# Patient Record
Sex: FEMALE | Race: WHITE | NOT HISPANIC OR LATINO
[De-identification: names, ages, dates, MRNs, and addresses within clinical notes are randomized per-mention and may not be internally consistent; named-entity substitution may affect disease eponyms.]

---

## 2018-01-08 ENCOUNTER — APPOINTMENT (OUTPATIENT)
Dept: GYNECOLOGIC ONCOLOGY | Facility: CLINIC | Age: 58
End: 2018-01-08
Payer: COMMERCIAL

## 2018-01-10 ENCOUNTER — APPOINTMENT (OUTPATIENT)
Dept: GYNECOLOGIC ONCOLOGY | Facility: CLINIC | Age: 58
End: 2018-01-10
Payer: COMMERCIAL

## 2018-01-10 VITALS
HEART RATE: 74 BPM | SYSTOLIC BLOOD PRESSURE: 146 MMHG | HEIGHT: 61 IN | BODY MASS INDEX: 30.02 KG/M2 | WEIGHT: 159 LBS | DIASTOLIC BLOOD PRESSURE: 84 MMHG

## 2018-01-10 PROCEDURE — 57452 EXAM OF CERVIX W/SCOPE: CPT

## 2018-01-11 LAB — HPV HIGH+LOW RISK DNA PNL CVX: NOT DETECTED

## 2018-01-23 LAB — CYTOLOGY CVX/VAG DOC THIN PREP: NORMAL

## 2018-06-19 ENCOUNTER — OTHER (OUTPATIENT)
Age: 58
End: 2018-06-19

## 2018-06-27 ENCOUNTER — APPOINTMENT (OUTPATIENT)
Dept: OBGYN | Facility: CLINIC | Age: 58
End: 2018-06-27
Payer: COMMERCIAL

## 2018-06-27 ENCOUNTER — ASOB RESULT (OUTPATIENT)
Age: 58
End: 2018-06-27

## 2018-06-27 PROCEDURE — 76830 TRANSVAGINAL US NON-OB: CPT

## 2018-08-20 ENCOUNTER — APPOINTMENT (OUTPATIENT)
Dept: GYNECOLOGIC ONCOLOGY | Facility: CLINIC | Age: 58
End: 2018-08-20
Payer: COMMERCIAL

## 2018-08-20 VITALS
BODY MASS INDEX: 30.21 KG/M2 | HEIGHT: 61 IN | SYSTOLIC BLOOD PRESSURE: 140 MMHG | DIASTOLIC BLOOD PRESSURE: 80 MMHG | WEIGHT: 160 LBS

## 2018-08-20 DIAGNOSIS — B97.7 PAPILLOMAVIRUS AS THE CAUSE OF DISEASES CLASSIFIED ELSEWHERE: ICD-10-CM

## 2018-08-20 PROCEDURE — 57452 EXAM OF CERVIX W/SCOPE: CPT

## 2018-08-20 PROCEDURE — 99213 OFFICE O/P EST LOW 20 MIN: CPT | Mod: 25

## 2018-08-23 LAB — HPV HIGH+LOW RISK DNA PNL CVX: NOT DETECTED

## 2018-08-29 LAB — CYTOLOGY CVX/VAG DOC THIN PREP: NORMAL

## 2018-09-26 ENCOUNTER — APPOINTMENT (OUTPATIENT)
Dept: OBGYN | Facility: CLINIC | Age: 58
End: 2018-09-26
Payer: COMMERCIAL

## 2018-09-26 ENCOUNTER — ASOB RESULT (OUTPATIENT)
Age: 58
End: 2018-09-26

## 2018-09-26 PROCEDURE — 76830 TRANSVAGINAL US NON-OB: CPT

## 2019-08-22 ENCOUNTER — APPOINTMENT (OUTPATIENT)
Dept: GYNECOLOGIC ONCOLOGY | Facility: CLINIC | Age: 59
End: 2019-08-22
Payer: COMMERCIAL

## 2019-08-22 VITALS
DIASTOLIC BLOOD PRESSURE: 76 MMHG | HEIGHT: 61 IN | WEIGHT: 148 LBS | SYSTOLIC BLOOD PRESSURE: 138 MMHG | HEART RATE: 74 BPM | BODY MASS INDEX: 27.94 KG/M2

## 2019-08-22 DIAGNOSIS — N87.0 MILD CERVICAL DYSPLASIA: ICD-10-CM

## 2019-08-22 PROCEDURE — 57452 EXAM OF CERVIX W/SCOPE: CPT

## 2019-08-22 PROCEDURE — 99213 OFFICE O/P EST LOW 20 MIN: CPT | Mod: 25

## 2019-09-05 LAB
CYTOLOGY CVX/VAG DOC THIN PREP: ABNORMAL
HPV HIGH+LOW RISK DNA PNL CVX: NOT DETECTED

## 2019-09-27 ENCOUNTER — APPOINTMENT (OUTPATIENT)
Dept: NEUROSURGERY | Facility: CLINIC | Age: 59
End: 2019-09-27
Payer: COMMERCIAL

## 2019-09-27 DIAGNOSIS — I67.1 CEREBRAL ANEURYSM, NONRUPTURED: ICD-10-CM

## 2019-09-27 PROCEDURE — 99205 OFFICE O/P NEW HI 60 MIN: CPT

## 2019-10-03 NOTE — ASSESSMENT
[FreeTextEntry1] : \par \par \par \par 2019\par \par \par \par Re:	Rosana Durbin \par :   1960\par \par \par The patient is a 59-year-old who had light cheek tingling and right ear pain.  The ear pain persisted, and her PCP ordered an MRI/MRA, and she was told to go to the ER at [facility].  Further CT and CTA were performed.  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  She is here for evaluation.\par \par Her past medical history is positive for hypertension and fibromyalgia.  She has back and neck issues, discs, scoliosis, and various disc diseases up and down her spine.  Past surgical history is positive for , 18 breast biopsies for fibroadenoma with atypical cells but no cancer.  A cervical biopsy was negative.  She has no allergies.  Social history is negative.  She works as a .  Family history is negative.  She has a normal neurological examination.\par \par IMPRESSION: Given the fact that the CTA shows a 4 mm anterior communicating artery aneurysm, the whole situation is somewhat unclear since the original images show a 2 mm lesion.  To work this out, I suggest cerebral angiography.  When I told the patient this, she told me she was scheduled to have this with Dr. Yusuf at [place].  I agree with this.  Any further decision making regarding the treatment of the aneurysm or not will be based on my review of the formal angiogram.  I welcomed the family to come back and show me the images.\par \par \par \par Salty Ibrahim M.D., F.A.C.S.\par [facility]\par

## 2019-10-03 NOTE — PHYSICAL EXAM
[General Appearance - Alert] : alert [General Appearance - In No Acute Distress] : in no acute distress [Impaired Insight] : insight and judgment were intact [Oriented To Time, Place, And Person] : oriented to person, place, and time [Affect] : the affect was normal [Person] : oriented to person [Place] : oriented to place [Short Term Intact] : short term memory intact [Time] : oriented to time [Span Intact] : the attention span was normal [Remote Intact] : remote memory intact [Fluency] : fluency intact [Concentration Intact] : normal concentrating ability [Comprehension] : comprehension intact [Current Events] : adequate knowledge of current events [Past History] : adequate knowledge of personal past history [Vocabulary] : adequate range of vocabulary [Cranial Nerves Optic (II)] : visual acuity intact bilaterally,  pupils equal round and reactive to light [Cranial Nerves Oculomotor (III)] : extraocular motion intact [Cranial Nerves Trigeminal (V)] : facial sensation intact symmetrically [Cranial Nerves Vestibulocochlear (VIII)] : hearing was intact bilaterally [Cranial Nerves Facial (VII)] : face symmetrical [Cranial Nerves Glossopharyngeal (IX)] : tongue and palate midline [Cranial Nerves Accessory (XI - Cranial And Spinal)] : head turning and shoulder shrug symmetric [Cranial Nerves Hypoglossal (XII)] : there was no tongue deviation with protrusion [Motor Strength] : muscle strength was normal in all four extremities [Motor Tone] : muscle tone was normal in all four extremities [No Muscle Atrophy] : normal bulk in all four extremities [Motor Handedness Right-Handed] : the patient is right hand dominant [Sensation Tactile Decrease] : light touch was intact [Balance] : balance was intact [Outer Ear] : the ears and nose were normal in appearance [Extraocular Movements] : extraocular movements were intact [Neck Appearance] : the appearance of the neck was normal [] : no respiratory distress [Respiration, Rhythm And Depth] : normal respiratory rhythm and effort [Heart Rate And Rhythm] : heart rate was normal and rhythm regular [Exaggerated Use Of Accessory Muscles For Inspiration] : no accessory muscle use [Involuntary Movements] : no involuntary movements were seen [Abnormal Walk] : normal gait [Skin Color & Pigmentation] : normal skin color and pigmentation [Skin Turgor] : normal skin turgor [Past-pointing] : there was no past-pointing [Tremor] : no tremor present

## 2019-10-03 NOTE — HISTORY OF PRESENT ILLNESS
[FreeTextEntry1] : Incidental cerebral aneurysm [de-identified] : 60 yo right handed female presents to the office for a neurosurgical consultation for a small 2 mm aneurysm. In the beginning of September, she experienced tingling on the right cheek and pain in outer ear. She saw her PCP Sahra Barker who ordered MRI/A and a small 2 mm acomm artery aneurysm was seen. PCP called pt on on a Friday night and instructed her to go to ER. She went to [facility] and workup CT was negative for heme and CTA revealed a 4mm aneurysm. It is felt that pt should have an angiogram.Pt has it scheduled for Oct 18th, at [facility]. \par \par Plan: fu if needed\par

## 2020-01-24 ENCOUNTER — TRANSCRIPTION ENCOUNTER (OUTPATIENT)
Age: 60
End: 2020-01-24

## 2020-06-13 DIAGNOSIS — D70.8 OTHER NEUTROPENIA: ICD-10-CM

## 2020-06-13 DIAGNOSIS — Z92.89 PERSONAL HISTORY OF OTHER MEDICAL TREATMENT: ICD-10-CM

## 2020-08-20 ENCOUNTER — APPOINTMENT (OUTPATIENT)
Dept: GYNECOLOGIC ONCOLOGY | Facility: CLINIC | Age: 60
End: 2020-08-20
Payer: COMMERCIAL

## 2020-08-20 VITALS
SYSTOLIC BLOOD PRESSURE: 153 MMHG | TEMPERATURE: 98.3 F | WEIGHT: 166 LBS | OXYGEN SATURATION: 99 % | DIASTOLIC BLOOD PRESSURE: 82 MMHG | BODY MASS INDEX: 31.37 KG/M2 | HEART RATE: 105 BPM

## 2020-08-20 PROCEDURE — 99213 OFFICE O/P EST LOW 20 MIN: CPT

## 2020-09-02 LAB
CYTOLOGY CVX/VAG DOC THIN PREP: ABNORMAL
HPV HIGH+LOW RISK DNA PNL CVX: DETECTED

## 2020-10-02 ENCOUNTER — ASOB RESULT (OUTPATIENT)
Age: 60
End: 2020-10-02

## 2020-10-02 ENCOUNTER — APPOINTMENT (OUTPATIENT)
Dept: OBGYN | Facility: CLINIC | Age: 60
End: 2020-10-02
Payer: COMMERCIAL

## 2020-10-02 PROCEDURE — 76830 TRANSVAGINAL US NON-OB: CPT

## 2021-08-23 ENCOUNTER — APPOINTMENT (OUTPATIENT)
Dept: GYNECOLOGIC ONCOLOGY | Facility: CLINIC | Age: 61
End: 2021-08-23
Payer: COMMERCIAL

## 2021-08-23 VITALS
OXYGEN SATURATION: 98 % | BODY MASS INDEX: 31.91 KG/M2 | WEIGHT: 169 LBS | SYSTOLIC BLOOD PRESSURE: 151 MMHG | HEART RATE: 75 BPM | HEIGHT: 61 IN | DIASTOLIC BLOOD PRESSURE: 82 MMHG

## 2021-08-23 DIAGNOSIS — Z78.0 ASYMPTOMATIC MENOPAUSAL STATE: ICD-10-CM

## 2021-08-23 PROCEDURE — 99213 OFFICE O/P EST LOW 20 MIN: CPT

## 2021-08-26 LAB
HPV 16 E6+E7 MRNA CVX QL NAA+PROBE: NOT DETECTED
HPV18+45 E6+E7 MRNA CVX QL NAA+PROBE: NOT DETECTED

## 2021-09-13 ENCOUNTER — NON-APPOINTMENT (OUTPATIENT)
Age: 61
End: 2021-09-13

## 2021-10-01 ENCOUNTER — TRANSCRIPTION ENCOUNTER (OUTPATIENT)
Age: 61
End: 2021-10-01

## 2021-10-01 LAB — CYTOLOGY CVX/VAG DOC THIN PREP: ABNORMAL

## 2021-11-18 ENCOUNTER — OUTPATIENT (OUTPATIENT)
Dept: OUTPATIENT SERVICES | Facility: HOSPITAL | Age: 61
LOS: 1 days | End: 2021-11-18
Payer: COMMERCIAL

## 2021-11-18 VITALS
HEIGHT: 61 IN | HEART RATE: 81 BPM | SYSTOLIC BLOOD PRESSURE: 130 MMHG | RESPIRATION RATE: 16 BRPM | WEIGHT: 166.01 LBS | DIASTOLIC BLOOD PRESSURE: 74 MMHG | OXYGEN SATURATION: 98 % | TEMPERATURE: 98 F

## 2021-11-18 DIAGNOSIS — R87.612 LOW GRADE SQUAMOUS INTRAEPITHELIAL LESION ON CYTOLOGIC SMEAR OF CERVIX (LGSIL): ICD-10-CM

## 2021-11-18 DIAGNOSIS — Z98.890 OTHER SPECIFIED POSTPROCEDURAL STATES: Chronic | ICD-10-CM

## 2021-11-18 DIAGNOSIS — Z98.891 HISTORY OF UTERINE SCAR FROM PREVIOUS SURGERY: Chronic | ICD-10-CM

## 2021-11-18 DIAGNOSIS — Z98.89 OTHER SPECIFIED POSTPROCEDURAL STATES: Chronic | ICD-10-CM

## 2021-11-18 DIAGNOSIS — Z87.442 PERSONAL HISTORY OF URINARY CALCULI: Chronic | ICD-10-CM

## 2021-11-18 DIAGNOSIS — Z98.890 OTHER SPECIFIED POSTPROCEDURAL STATES: ICD-10-CM

## 2021-11-18 LAB
A1C WITH ESTIMATED AVERAGE GLUCOSE RESULT: 5.3 % — SIGNIFICANT CHANGE UP (ref 4–5.6)
ALBUMIN SERPL ELPH-MCNC: 4.5 G/DL — SIGNIFICANT CHANGE UP (ref 3.3–5)
ALP SERPL-CCNC: 61 U/L — SIGNIFICANT CHANGE UP (ref 40–120)
ALT FLD-CCNC: 35 U/L — HIGH (ref 4–33)
ANION GAP SERPL CALC-SCNC: 12 MMOL/L — SIGNIFICANT CHANGE UP (ref 7–14)
AST SERPL-CCNC: 23 U/L — SIGNIFICANT CHANGE UP (ref 4–32)
BILIRUB SERPL-MCNC: 0.4 MG/DL — SIGNIFICANT CHANGE UP (ref 0.2–1.2)
BLD GP AB SCN SERPL QL: NEGATIVE — SIGNIFICANT CHANGE UP
BUN SERPL-MCNC: 22 MG/DL — SIGNIFICANT CHANGE UP (ref 7–23)
CALCIUM SERPL-MCNC: 9.5 MG/DL — SIGNIFICANT CHANGE UP (ref 8.4–10.5)
CHLORIDE SERPL-SCNC: 104 MMOL/L — SIGNIFICANT CHANGE UP (ref 98–107)
CO2 SERPL-SCNC: 25 MMOL/L — SIGNIFICANT CHANGE UP (ref 22–31)
CREAT SERPL-MCNC: 0.85 MG/DL — SIGNIFICANT CHANGE UP (ref 0.5–1.3)
ESTIMATED AVERAGE GLUCOSE: 105 — SIGNIFICANT CHANGE UP
GLUCOSE SERPL-MCNC: 85 MG/DL — SIGNIFICANT CHANGE UP (ref 70–99)
HCT VFR BLD CALC: 45.5 % — HIGH (ref 34.5–45)
HGB BLD-MCNC: 14.7 G/DL — SIGNIFICANT CHANGE UP (ref 11.5–15.5)
MCHC RBC-ENTMCNC: 30.6 PG — SIGNIFICANT CHANGE UP (ref 27–34)
MCHC RBC-ENTMCNC: 32.3 GM/DL — SIGNIFICANT CHANGE UP (ref 32–36)
MCV RBC AUTO: 94.8 FL — SIGNIFICANT CHANGE UP (ref 80–100)
NRBC # BLD: 0 /100 WBCS — SIGNIFICANT CHANGE UP
NRBC # FLD: 0 K/UL — SIGNIFICANT CHANGE UP
PLATELET # BLD AUTO: 237 K/UL — SIGNIFICANT CHANGE UP (ref 150–400)
POTASSIUM SERPL-MCNC: 3.8 MMOL/L — SIGNIFICANT CHANGE UP (ref 3.5–5.3)
POTASSIUM SERPL-SCNC: 3.8 MMOL/L — SIGNIFICANT CHANGE UP (ref 3.5–5.3)
PROT SERPL-MCNC: 7.3 G/DL — SIGNIFICANT CHANGE UP (ref 6–8.3)
RBC # BLD: 4.8 M/UL — SIGNIFICANT CHANGE UP (ref 3.8–5.2)
RBC # FLD: 14 % — SIGNIFICANT CHANGE UP (ref 10.3–14.5)
RH IG SCN BLD-IMP: POSITIVE — SIGNIFICANT CHANGE UP
SODIUM SERPL-SCNC: 141 MMOL/L — SIGNIFICANT CHANGE UP (ref 135–145)
WBC # BLD: 4 K/UL — SIGNIFICANT CHANGE UP (ref 3.8–10.5)
WBC # FLD AUTO: 4 K/UL — SIGNIFICANT CHANGE UP (ref 3.8–10.5)

## 2021-11-18 PROCEDURE — 93010 ELECTROCARDIOGRAM REPORT: CPT

## 2021-11-18 NOTE — H&P PST ADULT - PROBLEM SELECTOR PLAN 2
Pt on Aspirin 325 mg , as per neurosurgeon (P. A) Caryl Whitman's recommendations, decreased Aspirin to 81 mg 7 days prior to surgery and to continue Aspirin pre op.  Pending last neurology note (07/2021)

## 2021-11-18 NOTE — H&P PST ADULT - HISTORY OF PRESENT ILLNESS
60 y/o female with H/O: HTN, HLD, GERD     abnormal pap result and colposcopy findings x 1 yr, s/p Leep procedure 3 months ago presents to PST with pre op dx: Dysplasia of Cervix for pre surgical evaluation and scheduled for [facility] biopsy on 01/02/2014.    abn. pap / S/P LEEP procedures  60 y/o female with H/O: HTN, HLD, GERD presents to PST for pre op evaluation with long term h/o abnormal pap result and colposcopy, h/o multiple leep procedures. Now schedule for robotic assisted total laparoscopic hysterectomy, bilateral salpingo oophorectomy, possible staging. 60 y/o female with H/O: HTN, HLD, Cerebral aneurysm repair 2019, GERD presents to PST for pre op evaluation with long term h/o abnormal pap result and colposcopy, h/o multiple leep procedures. Now schedule for robotic assisted total laparoscopic hysterectomy, bilateral salpingo oophorectomy, possible staging.

## 2021-11-18 NOTE — H&P PST ADULT - ASSESSMENT
62 y/o female presents with pre op diagnosis: low grade SQ intraepithelial lesion on cytology smear cervix

## 2021-11-18 NOTE — H&P PST ADULT - NSICDXPASTSURGICALHX_GEN_ALL_CORE_FT
PAST SURGICAL HISTORY:  H/O bilateral breast biopsy multiple - result negative    H/O renal calculi s/p lithotripsy 2010     PAST SURGICAL HISTORY:  H/O bilateral breast biopsy multiple - result negative    H/O renal calculi s/p lithotripsy 2010    S/P colonoscopy 07/2021     PAST SURGICAL HISTORY:  H/O bilateral breast biopsy multiple - result negative    H/O  section     H/O renal calculi s/p lithotripsy     S/P biopsy of cervix 2014    S/P cerebral aneurysm repair     S/P colonoscopy 2021

## 2021-11-18 NOTE — H&P PST ADULT - NSICDXFAMILYHX_GEN_ALL_CORE_FT
FAMILY HISTORY:  Father  Still living? No  Family history of cancer, Age at diagnosis: Age Unknown  Family history of diabetes mellitus, Age at diagnosis: Age Unknown    Aunt  Still living? No  Family history of breast cancer in female, Age at diagnosis: Age Unknown

## 2021-11-18 NOTE — H&P PST ADULT - NEGATIVE GENERAL GENITOURINARY SYMPTOMS
no hematuria/no renal colic/no flank pain L/no flank pain R/no urine discoloration/no bladder infections/no dysuria no hematuria/no flank pain L/no flank pain R/no urine discoloration/no bladder infections/no dysuria

## 2021-11-18 NOTE — H&P PST ADULT - NEGATIVE GENERAL SYMPTOMS
no fever/no chills/no sweating/no anorexia/no weight loss/no weight gain/no polyphagia/no malaise/no fatigue

## 2021-11-18 NOTE — H&P PST ADULT - NEGATIVE NEUROLOGICAL SYMPTOMS
no weakness/no paresthesias/no generalized seizures/no focal seizures/no syncope/no tremors/no vertigo/no difficulty walking/no headache/no loss of consciousness/no confusion

## 2021-11-18 NOTE — H&P PST ADULT - NSICDXPASTMEDICALHX_GEN_ALL_CORE_FT
PAST MEDICAL HISTORY:  Asthmatic bronchitis 2013    Fibroadenoma of breast     Fibromyalgia     History of loop electrosurgical excision procedure (LEEP) of cervix     HLD (hyperlipidemia)     HTN (hypertension)     Ulcerative colitis      PAST MEDICAL HISTORY:  Asthmatic bronchitis 2013    Cervical herniated disc     Fibroadenoma of breast     Fibromyalgia     History of loop electrosurgical excision procedure (LEEP) of cervix     HLD (hyperlipidemia)     HTN (hypertension)     Lumbar herniated disc     Ulcerative colitis

## 2021-11-28 DIAGNOSIS — Z01.818 ENCOUNTER FOR OTHER PREPROCEDURAL EXAMINATION: ICD-10-CM

## 2021-11-29 ENCOUNTER — APPOINTMENT (OUTPATIENT)
Dept: DISASTER EMERGENCY | Facility: CLINIC | Age: 61
End: 2021-11-29

## 2021-11-29 PROBLEM — E78.5 HYPERLIPIDEMIA, UNSPECIFIED: Chronic | Status: ACTIVE | Noted: 2021-11-18

## 2021-11-29 PROBLEM — M51.26 OTHER INTERVERTEBRAL DISC DISPLACEMENT, LUMBAR REGION: Chronic | Status: ACTIVE | Noted: 2021-11-18

## 2021-11-29 PROBLEM — M50.20 OTHER CERVICAL DISC DISPLACEMENT, UNSPECIFIED CERVICAL REGION: Chronic | Status: ACTIVE | Noted: 2021-11-18

## 2021-11-29 PROBLEM — Z98.890 OTHER SPECIFIED POSTPROCEDURAL STATES: Chronic | Status: ACTIVE | Noted: 2021-11-18

## 2021-11-30 LAB — SARS-COV-2 N GENE NPH QL NAA+PROBE: NOT DETECTED

## 2021-12-06 ENCOUNTER — APPOINTMENT (OUTPATIENT)
Dept: OBGYN | Facility: CLINIC | Age: 61
End: 2021-12-06
Payer: COMMERCIAL

## 2021-12-06 VITALS
DIASTOLIC BLOOD PRESSURE: 90 MMHG | BODY MASS INDEX: 32.39 KG/M2 | SYSTOLIC BLOOD PRESSURE: 145 MMHG | HEIGHT: 60 IN | WEIGHT: 165 LBS

## 2021-12-06 DIAGNOSIS — R10.9 UNSPECIFIED ABDOMINAL PAIN: ICD-10-CM

## 2021-12-06 DIAGNOSIS — R39.15 URGENCY OF URINATION: ICD-10-CM

## 2021-12-06 DIAGNOSIS — R30.0 DYSURIA: ICD-10-CM

## 2021-12-06 DIAGNOSIS — R35.0 FREQUENCY OF MICTURITION: ICD-10-CM

## 2021-12-06 LAB
BILIRUB UR QL STRIP: NORMAL
GLUCOSE UR-MCNC: NORMAL
HCG UR QL: 0.2 EU/DL
HGB UR QL STRIP.AUTO: NORMAL
KETONES UR-MCNC: NORMAL
LEUKOCYTE ESTERASE UR QL STRIP: NORMAL
NITRITE UR QL STRIP: NORMAL
PH UR STRIP: 5.5
PROT UR STRIP-MCNC: 100
SP GR UR STRIP: > = 1.03

## 2021-12-06 PROCEDURE — 99213 OFFICE O/P EST LOW 20 MIN: CPT

## 2021-12-06 PROCEDURE — 81002 URINALYSIS NONAUTO W/O SCOPE: CPT

## 2021-12-06 NOTE — HISTORY OF PRESENT ILLNESS
[Suprapubic] : suprapubic area [Dull] : dull [Urination] : worsened by urination [Menopause Age: ____] : age at menopause was [unfilled]

## 2021-12-07 ENCOUNTER — NON-APPOINTMENT (OUTPATIENT)
Age: 61
End: 2021-12-07

## 2021-12-10 ENCOUNTER — NON-APPOINTMENT (OUTPATIENT)
Age: 61
End: 2021-12-10

## 2021-12-12 ENCOUNTER — NON-APPOINTMENT (OUTPATIENT)
Age: 61
End: 2021-12-12

## 2021-12-12 LAB — BACTERIA UR CULT: ABNORMAL

## 2021-12-13 ENCOUNTER — APPOINTMENT (OUTPATIENT)
Dept: DISASTER EMERGENCY | Facility: CLINIC | Age: 61
End: 2021-12-13

## 2021-12-16 ENCOUNTER — APPOINTMENT (OUTPATIENT)
Dept: GYNECOLOGIC ONCOLOGY | Facility: HOSPITAL | Age: 61
End: 2021-12-16

## 2021-12-21 ENCOUNTER — NON-APPOINTMENT (OUTPATIENT)
Age: 61
End: 2021-12-21

## 2022-01-24 ENCOUNTER — APPOINTMENT (OUTPATIENT)
Dept: GYNECOLOGIC ONCOLOGY | Facility: CLINIC | Age: 62
End: 2022-01-24
Payer: COMMERCIAL

## 2022-01-24 VITALS
SYSTOLIC BLOOD PRESSURE: 152 MMHG | HEART RATE: 81 BPM | DIASTOLIC BLOOD PRESSURE: 89 MMHG | HEIGHT: 60 IN | BODY MASS INDEX: 33.57 KG/M2 | WEIGHT: 171 LBS

## 2022-01-24 PROCEDURE — 99214 OFFICE O/P EST MOD 30 MIN: CPT

## 2022-01-24 RX ORDER — OLMESARTAN MEDOXOMIL 40 MG/1
TABLET, FILM COATED ORAL
Refills: 0 | Status: ACTIVE | COMMUNITY

## 2022-01-24 RX ORDER — AMOXICILLIN AND CLAVULANATE POTASSIUM 875; 125 MG/1; MG/1
875-125 TABLET, COATED ORAL
Qty: 20 | Refills: 0 | Status: DISCONTINUED | COMMUNITY
Start: 2022-01-14

## 2022-01-24 RX ORDER — HYDROCODONE BITARTRATE AND ACETAMINOPHEN 5; 325 MG/1; MG/1
5-325 TABLET ORAL
Qty: 21 | Refills: 0 | Status: DISCONTINUED | COMMUNITY
Start: 2021-09-30

## 2022-01-24 RX ORDER — OMEPRAZOLE 40 MG/1
CAPSULE, DELAYED RELEASE ORAL
Refills: 0 | Status: ACTIVE | COMMUNITY

## 2022-01-24 RX ORDER — ROSUVASTATIN CALCIUM 5 MG/1
TABLET, FILM COATED ORAL
Refills: 0 | Status: ACTIVE | COMMUNITY

## 2022-01-24 RX ORDER — ASPIRIN 81 MG
81 TABLET, DELAYED RELEASE (ENTERIC COATED) ORAL
Refills: 0 | Status: ACTIVE | COMMUNITY

## 2022-01-24 RX ORDER — SULFAMETHOXAZOLE AND TRIMETHOPRIM 400; 80 MG/1; MG/1
400-80 TABLET ORAL TWICE DAILY
Qty: 10 | Refills: 0 | Status: DISCONTINUED | COMMUNITY
Start: 2021-12-06 | End: 2022-01-24

## 2022-01-26 LAB — HPV HIGH+LOW RISK DNA PNL CVX: NOT DETECTED

## 2022-01-28 LAB — CYTOLOGY CVX/VAG DOC THIN PREP: ABNORMAL

## 2022-03-04 ENCOUNTER — NON-APPOINTMENT (OUTPATIENT)
Age: 62
End: 2022-03-04

## 2022-03-14 ENCOUNTER — OUTPATIENT (OUTPATIENT)
Dept: OUTPATIENT SERVICES | Facility: HOSPITAL | Age: 62
LOS: 1 days | End: 2022-03-14

## 2022-03-14 VITALS
HEART RATE: 79 BPM | OXYGEN SATURATION: 98 % | WEIGHT: 173.06 LBS | RESPIRATION RATE: 16 BRPM | SYSTOLIC BLOOD PRESSURE: 142 MMHG | TEMPERATURE: 98 F | HEIGHT: 59 IN | DIASTOLIC BLOOD PRESSURE: 76 MMHG

## 2022-03-14 DIAGNOSIS — R87.612 LOW GRADE SQUAMOUS INTRAEPITHELIAL LESION ON CYTOLOGIC SMEAR OF CERVIX (LGSIL): ICD-10-CM

## 2022-03-14 DIAGNOSIS — Z98.890 OTHER SPECIFIED POSTPROCEDURAL STATES: Chronic | ICD-10-CM

## 2022-03-14 DIAGNOSIS — Z98.89 OTHER SPECIFIED POSTPROCEDURAL STATES: Chronic | ICD-10-CM

## 2022-03-14 DIAGNOSIS — Z87.442 PERSONAL HISTORY OF URINARY CALCULI: Chronic | ICD-10-CM

## 2022-03-14 DIAGNOSIS — Z98.891 HISTORY OF UTERINE SCAR FROM PREVIOUS SURGERY: Chronic | ICD-10-CM

## 2022-03-14 LAB
A1C WITH ESTIMATED AVERAGE GLUCOSE RESULT: 5.1 % — SIGNIFICANT CHANGE UP (ref 4–5.6)
ALBUMIN SERPL ELPH-MCNC: 4.5 G/DL — SIGNIFICANT CHANGE UP (ref 3.3–5)
ALP SERPL-CCNC: 76 U/L — SIGNIFICANT CHANGE UP (ref 40–120)
ALT FLD-CCNC: 30 U/L — SIGNIFICANT CHANGE UP (ref 4–33)
ANION GAP SERPL CALC-SCNC: 12 MMOL/L — SIGNIFICANT CHANGE UP (ref 7–14)
AST SERPL-CCNC: 21 U/L — SIGNIFICANT CHANGE UP (ref 4–32)
BILIRUB SERPL-MCNC: 0.4 MG/DL — SIGNIFICANT CHANGE UP (ref 0.2–1.2)
BLD GP AB SCN SERPL QL: NEGATIVE — SIGNIFICANT CHANGE UP
BUN SERPL-MCNC: 18 MG/DL — SIGNIFICANT CHANGE UP (ref 7–23)
CALCIUM SERPL-MCNC: 9.4 MG/DL — SIGNIFICANT CHANGE UP (ref 8.4–10.5)
CHLORIDE SERPL-SCNC: 103 MMOL/L — SIGNIFICANT CHANGE UP (ref 98–107)
CO2 SERPL-SCNC: 26 MMOL/L — SIGNIFICANT CHANGE UP (ref 22–31)
CREAT SERPL-MCNC: 0.81 MG/DL — SIGNIFICANT CHANGE UP (ref 0.5–1.3)
EGFR: 82 ML/MIN/1.73M2 — SIGNIFICANT CHANGE UP
ESTIMATED AVERAGE GLUCOSE: 100 — SIGNIFICANT CHANGE UP
GLUCOSE SERPL-MCNC: 91 MG/DL — SIGNIFICANT CHANGE UP (ref 70–99)
HCT VFR BLD CALC: 44.3 % — SIGNIFICANT CHANGE UP (ref 34.5–45)
HGB BLD-MCNC: 14.4 G/DL — SIGNIFICANT CHANGE UP (ref 11.5–15.5)
MCHC RBC-ENTMCNC: 30.5 PG — SIGNIFICANT CHANGE UP (ref 27–34)
MCHC RBC-ENTMCNC: 32.5 GM/DL — SIGNIFICANT CHANGE UP (ref 32–36)
MCV RBC AUTO: 93.9 FL — SIGNIFICANT CHANGE UP (ref 80–100)
NRBC # BLD: 0 /100 WBCS — SIGNIFICANT CHANGE UP
NRBC # FLD: 0 K/UL — SIGNIFICANT CHANGE UP
PLATELET # BLD AUTO: 255 K/UL — SIGNIFICANT CHANGE UP (ref 150–400)
POTASSIUM SERPL-MCNC: 3.9 MMOL/L — SIGNIFICANT CHANGE UP (ref 3.5–5.3)
POTASSIUM SERPL-SCNC: 3.9 MMOL/L — SIGNIFICANT CHANGE UP (ref 3.5–5.3)
PROT SERPL-MCNC: 7 G/DL — SIGNIFICANT CHANGE UP (ref 6–8.3)
RBC # BLD: 4.72 M/UL — SIGNIFICANT CHANGE UP (ref 3.8–5.2)
RBC # FLD: 14.3 % — SIGNIFICANT CHANGE UP (ref 10.3–14.5)
RH IG SCN BLD-IMP: POSITIVE — SIGNIFICANT CHANGE UP
SODIUM SERPL-SCNC: 141 MMOL/L — SIGNIFICANT CHANGE UP (ref 135–145)
WBC # BLD: 4.22 K/UL — SIGNIFICANT CHANGE UP (ref 3.8–10.5)
WBC # FLD AUTO: 4.22 K/UL — SIGNIFICANT CHANGE UP (ref 3.8–10.5)

## 2022-03-14 RX ORDER — SODIUM CHLORIDE 9 MG/ML
3 INJECTION INTRAMUSCULAR; INTRAVENOUS; SUBCUTANEOUS EVERY 8 HOURS
Refills: 0 | Status: DISCONTINUED | OUTPATIENT
Start: 2022-03-24 | End: 2022-04-07

## 2022-03-14 NOTE — H&P PST ADULT - NEGATIVE GENERAL GENITOURINARY SYMPTOMS
no hematuria/no flank pain L/no flank pain R/no urine discoloration/no bladder infections/no dysuria

## 2022-03-14 NOTE — H&P PST ADULT - HISTORY OF PRESENT ILLNESS
62 y/o female with H/O: HTN, HLD, Cerebral aneurysm repair 2019, TIA, fibromyalgia, and GERD presents to [facility] for pre op evaluation with long term h/o abnormal pap result and colposcopy, h/o multiple leep procedures. Now scheduled for robotic assisted total laparoscopic hysterectomy, bilateral salpingo oophorectomy, possible staging. Procedure was rescheduled from 12/2021 due to insurance problem.

## 2022-03-14 NOTE — H&P PST ADULT - NSICDXPASTSURGICALHX_GEN_ALL_CORE_FT
PAST SURGICAL HISTORY:  H/O bilateral breast biopsy multiple - result negative    H/O  section     H/O renal calculi s/p lithotripsy     S/P biopsy of cervix 2014    S/P cerebral aneurysm repair 2019 coil    S/P colonoscopy 2021

## 2022-03-14 NOTE — H&P PST ADULT - NEGATIVE NEUROLOGICAL SYMPTOMS
no transient paralysis/no weakness/no paresthesias/no generalized seizures/no focal seizures/no syncope/no tremors/no vertigo/no difficulty walking/no headache/no loss of consciousness/no confusion

## 2022-03-14 NOTE — H&P PST ADULT - PROBLEM SELECTOR PLAN 2
Pt on Aspirin 325 mg , as per neurosurgeon (P. A) Caryl Whitman's recommendations, decreased Aspirin to 81 mg 7 days prior to surgery and to continue Aspirin pre op.  Pending last neurology note (07/2021) neurology evaluation in chart. Pt to decrease ASA to 81 mg one week preop.

## 2022-03-14 NOTE — H&P PST ADULT - NSICDXPASTMEDICALHX_GEN_ALL_CORE_FT
PAST MEDICAL HISTORY:  Asthmatic bronchitis 2013    Cervical herniated disc     Fibroadenoma of breast     Fibromyalgia     H/O spinal stenosis     History of cerebral aneurysm 2019 s/p coil    History of loop electrosurgical excision procedure (LEEP) of cervix     HLD (hyperlipidemia)     HTN (hypertension)     Lumbar herniated disc     Personal history of TIA (transient ischemic attack)     Ulcerative colitis

## 2022-03-14 NOTE — H&P PST ADULT - PRIMARY CARE PROVIDER
Dr Yusuf neurologist [phone]                                                                                     Dr Shah  [phone]

## 2022-03-14 NOTE — H&P PST ADULT - NEGATIVE OPHTHALMOLOGIC SYMPTOMS
no diplopia/no photophobia/no blurred vision L/no blurred vision R/no discharge L/no discharge R/no pain L/no pain R/no loss of vision L/no loss of vision R

## 2022-03-14 NOTE — H&P PST ADULT - PROBLEM SELECTOR PLAN 1
Schedule for robotic assisted total laparoscopic hysterectomy, bilateral salpingo oophorectomy, possible staging tentatively on 12/02/2021. Pre op instructions, chlorhexidine gluconate soap given and explained. Pt verbalized understanding.  Pt confirmed schedule appt for Covid test pre op Pt is tentatively scheduled for robotic assisted total laparoscopic hysterectomy, bilateral salpingo oophorectomy, possible staging for 3/24/22. Pre-op instructions provided. Pt given verbal and written instructions with teach back on chlorhexidine shampoo. Pt will take own omeprazole on the morning of procedure for GI prophylaxis. . Pt verbalized understanding with return demonstration.     Pt strongly advised to follow up with surgeon to discuss COVID testing requirements prior to procedure.     PIPER precautions    Cardiac and neurology evaluations in chart.

## 2022-03-17 ENCOUNTER — NON-APPOINTMENT (OUTPATIENT)
Age: 62
End: 2022-03-17

## 2022-03-23 ENCOUNTER — TRANSCRIPTION ENCOUNTER (OUTPATIENT)
Age: 62
End: 2022-03-23

## 2022-03-23 NOTE — ASU PATIENT PROFILE, ADULT - CAREGIVER
Yes

## 2022-03-23 NOTE — ASU PATIENT PROFILE, ADULT - FALL HARM RISK - UNIVERSAL INTERVENTIONS
Bed in lowest position, wheels locked, appropriate side rails in place/Call bell, personal items and telephone in reach/Instruct patient to call for assistance before getting out of bed or chair/Non-slip footwear when patient is out of bed/[place] to call system/Physically safe environment - no spills, clutter or unnecessary equipment/Purposeful Proactive Rounding/Room/bathroom lighting operational, light cord in reach

## 2022-03-24 ENCOUNTER — APPOINTMENT (OUTPATIENT)
Dept: GYNECOLOGIC ONCOLOGY | Facility: HOSPITAL | Age: 62
End: 2022-03-24

## 2022-03-24 ENCOUNTER — TRANSCRIPTION ENCOUNTER (OUTPATIENT)
Age: 62
End: 2022-03-24

## 2022-03-24 ENCOUNTER — OUTPATIENT (OUTPATIENT)
Dept: OUTPATIENT SERVICES | Facility: HOSPITAL | Age: 62
LOS: 1 days | Discharge: HOME | End: 2022-03-24
Payer: COMMERCIAL

## 2022-03-24 ENCOUNTER — RESULT REVIEW (OUTPATIENT)
Age: 62
End: 2022-03-24

## 2022-03-24 VITALS
RESPIRATION RATE: 18 BRPM | WEIGHT: 173.06 LBS | HEART RATE: 82 BPM | HEIGHT: 59 IN | SYSTOLIC BLOOD PRESSURE: 136 MMHG | OXYGEN SATURATION: 98 % | DIASTOLIC BLOOD PRESSURE: 73 MMHG | TEMPERATURE: 98 F

## 2022-03-24 VITALS
OXYGEN SATURATION: 100 % | HEART RATE: 64 BPM | SYSTOLIC BLOOD PRESSURE: 153 MMHG | DIASTOLIC BLOOD PRESSURE: 67 MMHG | RESPIRATION RATE: 15 BRPM

## 2022-03-24 DIAGNOSIS — Z98.890 OTHER SPECIFIED POSTPROCEDURAL STATES: Chronic | ICD-10-CM

## 2022-03-24 DIAGNOSIS — Z87.442 PERSONAL HISTORY OF URINARY CALCULI: Chronic | ICD-10-CM

## 2022-03-24 DIAGNOSIS — Z98.891 HISTORY OF UTERINE SCAR FROM PREVIOUS SURGERY: Chronic | ICD-10-CM

## 2022-03-24 DIAGNOSIS — Z98.89 OTHER SPECIFIED POSTPROCEDURAL STATES: Chronic | ICD-10-CM

## 2022-03-24 DIAGNOSIS — R87.612 LOW GRADE SQUAMOUS INTRAEPITHELIAL LESION ON CYTOLOGIC SMEAR OF CERVIX (LGSIL): ICD-10-CM

## 2022-03-24 LAB
BASOPHILS # BLD AUTO: 0.04 K/UL — SIGNIFICANT CHANGE UP (ref 0–0.2)
BASOPHILS NFR BLD AUTO: 0.6 % — SIGNIFICANT CHANGE UP (ref 0–2)
EOSINOPHIL # BLD AUTO: 0.05 K/UL — SIGNIFICANT CHANGE UP (ref 0–0.5)
EOSINOPHIL NFR BLD AUTO: 0.8 % — SIGNIFICANT CHANGE UP (ref 0–6)
GLUCOSE BLDC GLUCOMTR-MCNC: 97 MG/DL — SIGNIFICANT CHANGE UP (ref 70–99)
HCT VFR BLD CALC: 42.6 % — SIGNIFICANT CHANGE UP (ref 34.5–45)
HGB BLD-MCNC: 13.9 G/DL — SIGNIFICANT CHANGE UP (ref 11.5–15.5)
IANC: 5.37 K/UL — SIGNIFICANT CHANGE UP (ref 1.8–7.4)
IMM GRANULOCYTES NFR BLD AUTO: 0.3 % — SIGNIFICANT CHANGE UP (ref 0–1.5)
LYMPHOCYTES # BLD AUTO: 0.98 K/UL — LOW (ref 1–3.3)
LYMPHOCYTES # BLD AUTO: 14.8 % — SIGNIFICANT CHANGE UP (ref 13–44)
MCHC RBC-ENTMCNC: 30.8 PG — SIGNIFICANT CHANGE UP (ref 27–34)
MCHC RBC-ENTMCNC: 32.6 GM/DL — SIGNIFICANT CHANGE UP (ref 32–36)
MCV RBC AUTO: 94.5 FL — SIGNIFICANT CHANGE UP (ref 80–100)
MONOCYTES # BLD AUTO: 0.18 K/UL — SIGNIFICANT CHANGE UP (ref 0–0.9)
MONOCYTES NFR BLD AUTO: 2.7 % — SIGNIFICANT CHANGE UP (ref 2–14)
NEUTROPHILS # BLD AUTO: 5.37 K/UL — SIGNIFICANT CHANGE UP (ref 1.8–7.4)
NEUTROPHILS NFR BLD AUTO: 80.8 % — HIGH (ref 43–77)
NRBC # BLD: 0 /100 WBCS — SIGNIFICANT CHANGE UP
NRBC # FLD: 0 K/UL — SIGNIFICANT CHANGE UP
PLATELET # BLD AUTO: 227 K/UL — SIGNIFICANT CHANGE UP (ref 150–400)
RBC # BLD: 4.51 M/UL — SIGNIFICANT CHANGE UP (ref 3.8–5.2)
RBC # FLD: 14.4 % — SIGNIFICANT CHANGE UP (ref 10.3–14.5)
WBC # BLD: 6.51 K/UL — SIGNIFICANT CHANGE UP (ref 3.8–10.5)
WBC # FLD AUTO: 6.51 K/UL — SIGNIFICANT CHANGE UP (ref 3.8–10.5)

## 2022-03-24 PROCEDURE — S2900 ROBOTIC SURGICAL SYSTEM: CPT | Mod: NC

## 2022-03-24 PROCEDURE — 58571 TLH W/T/O 250 G OR LESS: CPT

## 2022-03-24 PROCEDURE — 88309 TISSUE EXAM BY PATHOLOGIST: CPT | Mod: 26

## 2022-03-24 RX ORDER — SODIUM CHLORIDE 9 MG/ML
1000 INJECTION, SOLUTION INTRAVENOUS
Refills: 0 | Status: DISCONTINUED | OUTPATIENT
Start: 2022-03-24 | End: 2022-04-07

## 2022-03-24 RX ORDER — OXYCODONE HYDROCHLORIDE 5 MG/1
1 TABLET ORAL
Qty: 8 | Refills: 0
Start: 2022-03-24 | End: 2022-03-25

## 2022-03-24 RX ORDER — SODIUM CHLORIDE 9 MG/ML
1000 INJECTION, SOLUTION INTRAVENOUS
Refills: 0 | Status: DISCONTINUED | OUTPATIENT
Start: 2022-03-24 | End: 2022-03-24

## 2022-03-24 RX ORDER — DULOXETINE HYDROCHLORIDE 30 MG/1
1 CAPSULE, DELAYED RELEASE ORAL
Qty: 0 | Refills: 0 | DISCHARGE

## 2022-03-24 RX ORDER — ROSUVASTATIN CALCIUM 5 MG/1
1 TABLET ORAL
Qty: 0 | Refills: 0 | DISCHARGE

## 2022-03-24 RX ORDER — CHLORHEXIDINE GLUCONATE 213 G/1000ML
1 SOLUTION TOPICAL ONCE
Refills: 0 | Status: DISCONTINUED | OUTPATIENT
Start: 2022-03-24 | End: 2022-03-24

## 2022-03-24 RX ORDER — ASPIRIN/CALCIUM CARB/MAGNESIUM 324 MG
1 TABLET ORAL
Qty: 0 | Refills: 0 | DISCHARGE

## 2022-03-24 RX ORDER — OLMESARTAN MEDOXOMIL 5 MG/1
1 TABLET, FILM COATED ORAL
Qty: 0 | Refills: 0 | DISCHARGE

## 2022-03-24 RX ORDER — HYDROMORPHONE HYDROCHLORIDE 2 MG/ML
0.5 INJECTION INTRAMUSCULAR; INTRAVENOUS; SUBCUTANEOUS
Refills: 0 | Status: DISCONTINUED | OUTPATIENT
Start: 2022-03-24 | End: 2022-03-24

## 2022-03-24 RX ORDER — ACETAMINOPHEN 500 MG
2 TABLET ORAL
Qty: 80 | Refills: 0
Start: 2022-03-24 | End: 2022-04-02

## 2022-03-24 RX ORDER — HYDROMORPHONE HYDROCHLORIDE 2 MG/ML
1 INJECTION INTRAMUSCULAR; INTRAVENOUS; SUBCUTANEOUS
Refills: 0 | Status: DISCONTINUED | OUTPATIENT
Start: 2022-03-24 | End: 2022-03-24

## 2022-03-24 RX ORDER — ALBUTEROL 90 UG/1
2 AEROSOL, METERED ORAL
Qty: 0 | Refills: 0 | DISCHARGE

## 2022-03-24 RX ORDER — ONDANSETRON 8 MG/1
4 TABLET, FILM COATED ORAL ONCE
Refills: 0 | Status: DISCONTINUED | OUTPATIENT
Start: 2022-03-24 | End: 2022-04-07

## 2022-03-24 RX ORDER — OMEPRAZOLE 10 MG/1
1 CAPSULE, DELAYED RELEASE ORAL
Qty: 0 | Refills: 0 | DISCHARGE

## 2022-03-24 RX ORDER — OXYCODONE HYDROCHLORIDE 5 MG/1
1 TABLET ORAL
Qty: 5 | Refills: 0
Start: 2022-03-24

## 2022-03-24 RX ADMIN — SODIUM CHLORIDE 125 MILLILITER(S): 9 INJECTION, SOLUTION INTRAVENOUS at 10:00

## 2022-03-24 NOTE — ASU DISCHARGE PLAN (ADULT/PEDIATRIC) - ASU DC SPECIAL INSTRUCTIONSFT
Please schedule a follow-up appointment with Dr. Hernandez in the office in 2 weeks for a postoperative check. Please schedule a follow-up appointment with Dr. Hernandez in the office in 2 weeks for a postoperative check.    You received intravenous tylenol in the operating room at 8:50am. You may take additional tylenol products after 2:50pm.    You also received intravenous toradol (NSAID) in the operating room at 9:30am. You may take additional NSAIDs (advil/ibuprofen/motrin) after 3:30pm. Please schedule a follow-up appointment with Dr. Hernandez in the office in 2 weeks for a postoperative check.    You received intravenous Tylenol (1000mg) in the operating room at 8:50am. You may take extra strength Tylenol products after 2:50pm.    You also received intravenous Toradol (NSAID) in the operating room at 9:30am. You may take additional NSAIDs (advil/ibuprofen/motrin) after 3:30pm.

## 2022-03-24 NOTE — ASU DISCHARGE PLAN (ADULT/PEDIATRIC) - CARE PROVIDER_API CALL
Blood cx and lactic acid drawn by Yadira GRECO   Claudia Hernandez)  Gynecologic Oncology; Obstetrics and Gynecology  [address] [place] [address]  Phone: [phone]  Fax: [phone]  Follow Up Time: 2 weeks

## 2022-03-24 NOTE — ASU DISCHARGE PLAN (ADULT/PEDIATRIC) - NS MD DC FALL RISK RISK
For information on Fall & Injury Prevention, visit: [email or web address] OR  [email or web address] OR  https://www.cdc.gov/steadi/patient.html

## 2022-03-24 NOTE — ASU DISCHARGE PLAN (ADULT/PEDIATRIC) - MEDICATION INSTRUCTIONS
Acetaminophen and Ibuprofen as needed; Oxycodone as needed for severe pain unrelieved by Acetaminophen and Ibuprofen

## 2022-03-24 NOTE — CHART NOTE - NSCHARTNOTEFT_GEN_A_CORE
PA GynOn Post Op Note    Pt seen and examined at bedside in PACU resting comfortably.  Pt denies fever, chills, chest pain, SOB, nausea, vomiting, lightheadedness, dizziness.  Bowman catheter was discontinued at 1030 and pt has already voided 2 times with good out put..      T(C): 36.5 (03-24-22 @ 11:51), Max: 36.9 (03-24-22 @ 06:20)  HR: 64 (03-24-22 @ 12:15) (64 - 86)  BP: 153/67 (03-24-22 @ 12:15) (98/65 - 173/68)  RR: 15 (03-24-22 @ 12:15) (12 - 22)  SpO2: 100% (03-24-22 @ 12:15) (94% - 100%)  Wt(kg): --  I&O's Detail    24 Mar 2022 07:01  -  24 Mar 2022 12:31  --------------------------------------------------------  IN:    Lactated Ringers: 375 mL    Oral Fluid: 200 mL  Total IN: 575 mL    OUT:    Indwelling Catheter - Urethral (mL): 200 mL    Voided (mL): 150 mL  Total OUT: 350 mL    Total NET: 225 mL    Physical Exam:  Constitutional: WDWN female, NAD AxOx3  Skin: warm and dry, no breakdowns noted  Chest: s1s2+, RRR, clear to auscultation bilaterally, no w/r/r    Abdomen: soft, nondistended, no guarding, no rebound, + bowel sounds,  Appropriate tenderness noted   Incisional site:  4 scope sites all clean and dry with outer dressings intact.   Extremities: no lower extremity edema or calf tenderness bilaterally    LABS:                        13.9   6.51  )-----------( 227      ( 24 Mar 2022 10:34 )             42.6       a/p: This 61y female, s/p Robotic Assisted TLH, BSO, Frozen->benign. EBL: 50cc, Pt stable    CV: hemodynamically stable  PUL: adequate on RA  GI: tolerating sips of fluids and crackers  : voiding freely without dysuria and good urine output  ID: afebrile, WBC stable, labs as above  DVT prophylaxis: SQ Heparin intraop  Pain Management: controlled presently  Patient is cleared for discharge by Gyn Onc after she meets all the PACU post operative criteria (voiding without dysuria, able to tolerate PO meds and food, and able to ambulate without assistance) Prescriptions sent electronically to patient's pharmacy of choice.   Patient has follow up appointment in 2 weeks  d/w gyn onc team    Valerie Whitman, PAC  #[id number] spectra

## 2022-03-24 NOTE — ASU DISCHARGE PLAN (ADULT/PEDIATRIC) - ASU DC REMOVE DRESSINGFT
pt placed on cardiac monitor and . tele tech made aware.
pt resting comfortably in bed. denies pain. pt and family made aware of plan. will continue to monitor.
48

## 2022-03-24 NOTE — BRIEF OPERATIVE NOTE - NSICDXBRIEFPROCEDURE_GEN_ALL_CORE_FT
PROCEDURES:  Robot-assisted total hysterectomy for uterus less than 250 grams 24-Mar-2022 09:49:54  Qian Lima  Robot-assisted bilateral salpingo-oophorectomy 24-Mar-2022 09:50:05  Qian Lima

## 2022-03-24 NOTE — BRIEF OPERATIVE NOTE - OPERATION/FINDINGS
On pelvic exam, unable to properly visualize cervical os to enable dilation and placement of uterine manipulator. Small anteverted uterus palpable, adnexa nonpalpable bilaterally  On laparoscopic survey, grossly normal appearing uterus, bilateral fallopian and ovaries. Small subcentimeter simple-appearing left ovarian cyst. 3cm simple-appearing right ovarian cyst.  Retrograde instillation of 200cc of methylene blue into bladder with no extravasation of blue dye into abdominopelvic cavity.    Frozen=benign, grossly normal-appearing cervix

## 2022-03-25 ENCOUNTER — NON-APPOINTMENT (OUTPATIENT)
Age: 62
End: 2022-03-25

## 2022-03-25 PROBLEM — Z86.73 PERSONAL HISTORY OF TRANSIENT ISCHEMIC ATTACK (TIA), AND CEREBRAL INFARCTION WITHOUT RESIDUAL DEFICITS: Chronic | Status: ACTIVE | Noted: 2022-03-14

## 2022-03-25 PROBLEM — Z87.39 PERSONAL HISTORY OF OTHER DISEASES OF THE MUSCULOSKELETAL SYSTEM AND CONNECTIVE TISSUE: Chronic | Status: ACTIVE | Noted: 2022-03-14

## 2022-03-25 PROBLEM — Z86.79 PERSONAL HISTORY OF OTHER DISEASES OF THE CIRCULATORY SYSTEM: Chronic | Status: ACTIVE | Noted: 2022-03-14

## 2022-04-07 LAB — SURGICAL PATHOLOGY STUDY: SIGNIFICANT CHANGE UP

## 2022-04-08 ENCOUNTER — APPOINTMENT (OUTPATIENT)
Dept: GYNECOLOGIC ONCOLOGY | Facility: CLINIC | Age: 62
End: 2022-04-08
Payer: COMMERCIAL

## 2022-04-08 VITALS
TEMPERATURE: 98.5 F | BODY MASS INDEX: 33.77 KG/M2 | SYSTOLIC BLOOD PRESSURE: 134 MMHG | WEIGHT: 172 LBS | DIASTOLIC BLOOD PRESSURE: 82 MMHG | HEART RATE: 113 BPM | HEIGHT: 60 IN

## 2022-04-08 PROCEDURE — 99024 POSTOP FOLLOW-UP VISIT: CPT

## 2022-04-15 ENCOUNTER — NON-APPOINTMENT (OUTPATIENT)
Age: 62
End: 2022-04-15

## 2022-05-09 ENCOUNTER — APPOINTMENT (OUTPATIENT)
Dept: GYNECOLOGIC ONCOLOGY | Facility: CLINIC | Age: 62
End: 2022-05-09
Payer: COMMERCIAL

## 2022-05-09 VITALS
TEMPERATURE: 97.7 F | HEIGHT: 60 IN | HEART RATE: 79 BPM | DIASTOLIC BLOOD PRESSURE: 98 MMHG | WEIGHT: 176.13 LBS | BODY MASS INDEX: 34.58 KG/M2 | SYSTOLIC BLOOD PRESSURE: 152 MMHG

## 2022-05-09 DIAGNOSIS — R87.612 LOW GRADE SQUAMOUS INTRAEPITHELIAL LESION ON CYTOLOGIC SMEAR OF CERVIX (LGSIL): ICD-10-CM

## 2022-05-09 PROCEDURE — 99024 POSTOP FOLLOW-UP VISIT: CPT

## 2022-10-13 ENCOUNTER — NON-APPOINTMENT (OUTPATIENT)
Age: 62
End: 2022-10-13

## 2022-11-03 ENCOUNTER — NON-APPOINTMENT (OUTPATIENT)
Age: 62
End: 2022-11-03

## 2022-11-17 ENCOUNTER — NON-APPOINTMENT (OUTPATIENT)
Age: 62
End: 2022-11-17

## 2022-11-21 ENCOUNTER — NON-APPOINTMENT (OUTPATIENT)
Age: 62
End: 2022-11-21

## 2023-08-01 ENCOUNTER — APPOINTMENT (OUTPATIENT)
Dept: OBGYN | Facility: CLINIC | Age: 63
End: 2023-08-01
Payer: MEDICARE

## 2023-08-01 ENCOUNTER — LABORATORY RESULT (OUTPATIENT)
Age: 63
End: 2023-08-01

## 2023-08-01 VITALS
WEIGHT: 162 LBS | HEIGHT: 60.63 IN | BODY MASS INDEX: 30.98 KG/M2 | DIASTOLIC BLOOD PRESSURE: 60 MMHG | SYSTOLIC BLOOD PRESSURE: 122 MMHG

## 2023-08-01 DIAGNOSIS — M85.80 OTHER SPECIFIED DISORDERS OF BONE DENSITY AND STRUCTURE, UNSPECIFIED SITE: ICD-10-CM

## 2023-08-01 DIAGNOSIS — K64.9 UNSPECIFIED HEMORRHOIDS: ICD-10-CM

## 2023-08-01 PROCEDURE — 82270 OCCULT BLOOD FECES: CPT

## 2023-08-01 PROCEDURE — G0101: CPT | Mod: GA

## 2023-08-01 PROCEDURE — 87210 SMEAR WET MOUNT SALINE/INK: CPT | Mod: QW

## 2023-08-01 NOTE — PHYSICAL EXAM
[Chaperone Present] : A chaperone was present in the examining room during all aspects of the physical examination [Appropriately responsive] : appropriately responsive [Alert] : alert [No Acute Distress] : no acute distress [No Lymphadenopathy] : no lymphadenopathy [Regular Rate Rhythm] : regular rate rhythm [No Murmurs] : no murmurs [Clear to Auscultation B/L] : clear to auscultation bilaterally [Soft] : soft [Non-tender] : non-tender [Non-distended] : non-distended [No HSM] : No HSM [No Lesions] : no lesions [No Mass] : no mass [Oriented x3] : oriented x3 [Examination Of The Breasts] : a normal appearance [No Masses] : no breast masses were palpable [Labia Majora] : normal [Labia Minora] : normal [Normal] : normal [Absent] : absent [Uterine Adnexae] : absent [No Tenderness] : no tenderness [External Hemorrhoid] : external hemorrhoid

## 2023-08-01 NOTE — HISTORY OF PRESENT ILLNESS
[TextBox_4] : Annual exam for this 63 year old female, , LMP , S/p MANUEL BSO for chronic dysplasia with no gyn complaints. On prolia for osteopenia x 1 year.

## 2023-08-08 LAB — CYTOLOGY CVX/VAG DOC THIN PREP: ABNORMAL

## 2023-12-05 ENCOUNTER — APPOINTMENT (OUTPATIENT)
Dept: OBGYN | Facility: CLINIC | Age: 63
End: 2023-12-05
Payer: MEDICARE

## 2023-12-05 VITALS
SYSTOLIC BLOOD PRESSURE: 122 MMHG | BODY MASS INDEX: 30.36 KG/M2 | HEIGHT: 62 IN | WEIGHT: 165 LBS | DIASTOLIC BLOOD PRESSURE: 74 MMHG

## 2023-12-05 DIAGNOSIS — N94.9 UNSPECIFIED CONDITION ASSOCIATED WITH FEMALE GENITAL ORGANS AND MENSTRUAL CYCLE: ICD-10-CM

## 2023-12-05 PROCEDURE — 99214 OFFICE O/P EST MOD 30 MIN: CPT

## 2024-02-26 NOTE — ASU PATIENT PROFILE, ADULT - PAIN SCALE PREFERRED, PROFILE
Rotate Tylenol and Motrin for pain. Follow-up with your Doctor this week for further evaluation. Return if problems.    numerical 0-10

## 2024-05-20 ENCOUNTER — NON-APPOINTMENT (OUTPATIENT)
Age: 64
End: 2024-05-20

## 2024-05-22 ENCOUNTER — APPOINTMENT (OUTPATIENT)
Dept: ORTHOPEDIC SURGERY | Facility: CLINIC | Age: 64
End: 2024-05-22
Payer: MEDICARE

## 2024-05-22 VITALS — BODY MASS INDEX: 31.91 KG/M2 | HEIGHT: 61 IN | WEIGHT: 169 LBS

## 2024-05-22 DIAGNOSIS — Z87.39 PERSONAL HISTORY OF OTHER DISEASES OF THE MUSCULOSKELETAL SYSTEM AND CONNECTIVE TISSUE: ICD-10-CM

## 2024-05-22 DIAGNOSIS — Z86.73 PERSONAL HISTORY OF TRANSIENT ISCHEMIC ATTACK (TIA), AND CEREBRAL INFARCTION W/OUT RESIDUAL DEFICITS: ICD-10-CM

## 2024-05-22 DIAGNOSIS — Z95.818 PRESENCE OF OTHER CARDIAC IMPLANTS AND GRAFTS: ICD-10-CM

## 2024-05-22 PROCEDURE — 99204 OFFICE O/P NEW MOD 45 MIN: CPT

## 2024-05-22 PROCEDURE — 73030 X-RAY EXAM OF SHOULDER: CPT | Mod: LT

## 2024-05-22 RX ORDER — ASPIRIN 325 MG/1
325 TABLET, FILM COATED ORAL
Refills: 0 | Status: ACTIVE | COMMUNITY

## 2024-05-22 RX ORDER — DULOXETINE HYDROCHLORIDE 30 MG/1
CAPSULE, DELAYED RELEASE ORAL
Refills: 0 | Status: ACTIVE | COMMUNITY

## 2024-05-22 NOTE — REASON FOR VISIT
[Other: _____] : [unfilled] [FreeTextEntry2] : This is a 63 year old LHD female out on disability from spine surgery with left shoulder pain since 5/21/24 after falling backward down the stairs. Went to [facility] ER. Xrays and a CT scan was done. She was told she had a humeral neck fracture and was given a sling. No prior history. Reaching is painful. Sleep is affected. There is no n/t. Tylenol use as needed with temporary relief. She was given Oxy in the hospital. She cannot take NSAIDs due to h/o brain aneurysm.  Her sister is here.

## 2024-05-22 NOTE — CONSULT LETTER
[Dear  ___] : Dear  [unfilled], [FreeTextEntry1] : Thank you for referring your patient for consultation.  Please see my note below.   If you have any questions, please do not hesitate to contact me.   Sincerely,   Scott Love M.D. Shoulder Surgery

## 2024-05-22 NOTE — IMAGING
[Left] : left shoulder [FreeTextEntry1] : There is a minimally displaced humeral neck fracture. Spinal rods are visible.

## 2024-05-22 NOTE — ASSESSMENT
[FreeTextEntry1] : We discussed the underlying pathology.  Treatment options reviewed.  Sling use discussed.  Ice use discussed.  She will follow up in 1 week with repeat xrays.  PT may begin at that time.  Percocet is prescribed.  Cautions discussed.  Questions answered.  Patient seen by Scott Love M.D. Entered by Laurie Wade acting as scribe.

## 2024-05-22 NOTE — HISTORY OF PRESENT ILLNESS
[9] : 9 [Burning] : burning [Constant] : constant [Household chores] : household chores [Leisure] : leisure [Sleep] : sleep [Meds] : meds [Disabled] : Work status: disabled [de-identified] : Patient fell in her house yesterday fracturing her left shoulder. [] : no [FreeTextEntry1] : Left shoulder [de-identified] : movement [de-identified] : 5/21/2024 [de-identified] : [facility] [de-identified] : xray

## 2024-05-29 ENCOUNTER — APPOINTMENT (OUTPATIENT)
Dept: ORTHOPEDIC SURGERY | Facility: CLINIC | Age: 64
End: 2024-05-29
Payer: MEDICARE

## 2024-05-29 VITALS — BODY MASS INDEX: 31.91 KG/M2 | WEIGHT: 169 LBS | HEIGHT: 61 IN

## 2024-05-29 PROCEDURE — 73030 X-RAY EXAM OF SHOULDER: CPT | Mod: LT

## 2024-05-29 PROCEDURE — 99214 OFFICE O/P EST MOD 30 MIN: CPT

## 2024-05-29 NOTE — REASON FOR VISIT
[Other: _____] : [unfilled] [FreeTextEntry2] : This is a 63 year old LHD female out on disability from spine surgery with left shoulder pain since 5/21/24 after falling backward down the stairs. Went to [facility] ER. Xrays and a CT scan was done. She was told she had a humeral neck fracture and was given a sling. No prior history. Reaching is painful. Sleep is affected. There is no n/t. Tylenol use as needed with temporary relief. She was given Oxy in the hospital. She cannot take NSAIDs due to h/o brain aneurysm.  She has been in her sling.  She is doing OK.  She is due for a bone density study.  Her  is here.

## 2024-05-29 NOTE — ASSESSMENT
[FreeTextEntry1] : We discussed her issues. She does take pain meds. Sling readjusted. HEP demonstrated. Her spine issues are an issue. Questions answered. Repeat x-rays planned.  Patient seen by Scott Love M.D.

## 2024-05-29 NOTE — PHYSICAL EXAM
[Left] : left shoulder [] : no sensory deficits [FreeTextEntry9] : Range of motion was not assessed. [de-identified] : Strength was not assessed.

## 2024-05-29 NOTE — HISTORY OF PRESENT ILLNESS
[Disabled] : Work status: disabled [de-identified] : Left shoulder follow up. Using sling as advised.

## 2024-06-07 ENCOUNTER — APPOINTMENT (OUTPATIENT)
Dept: ORTHOPEDIC SURGERY | Facility: CLINIC | Age: 64
End: 2024-06-07
Payer: MEDICARE

## 2024-06-07 VITALS — BODY MASS INDEX: 31.91 KG/M2 | WEIGHT: 169 LBS | HEIGHT: 61 IN

## 2024-06-07 DIAGNOSIS — S42.292A OTHER DISPLACED FRACTURE OF UPPER END OF LEFT HUMERUS, INITIAL ENCOUNTER FOR CLOSED FRACTURE: ICD-10-CM

## 2024-06-07 PROCEDURE — 99214 OFFICE O/P EST MOD 30 MIN: CPT

## 2024-06-07 PROCEDURE — 73030 X-RAY EXAM OF SHOULDER: CPT | Mod: LT

## 2024-06-07 RX ORDER — OXYCODONE AND ACETAMINOPHEN 5; 325 MG/1; MG/1
5-325 TABLET ORAL
Qty: 30 | Refills: 0 | Status: ACTIVE | COMMUNITY
Start: 2024-05-22 | End: 1900-01-01

## 2024-06-07 NOTE — ASSESSMENT
[FreeTextEntry1] : We reviewed her course. PT will begin for the shoulder. Table slides demonstrated. Questions answered.  Patient was seen by Dr. Scott Love. Patient was seen by Yani COLLINS under the supervision of Dr. Scott Love. Progress note was completed by Yani COLLINS.

## 2024-06-07 NOTE — HISTORY OF PRESENT ILLNESS
[Disabled] : Work status: disabled [de-identified] : Left shoulder follow up. Using sling as advised. Doing HEP.

## 2024-06-07 NOTE — PHYSICAL EXAM
[Left] : left shoulder [] : no sensory deficits [FreeTextEntry9] : Range of motion was not assessed. [de-identified] : Strength was not assessed.

## 2024-06-07 NOTE — REASON FOR VISIT
[Spouse] : spouse [FreeTextEntry2] : This is a 63 year old LHD female out on disability from spine surgery with left shoulder pain since 5/21/24 after falling backward down the stairs. Went to [facility] ER. Xrays and a CT scan was done. She was told she had a humeral neck fracture and was given a sling. No prior history. Reaching is painful. Sleep is affected. There is no n/t. Tylenol use as needed with temporary relief. She was given Oxy in the hospital. She cannot take NSAIDs due to h/o brain aneurysm.  She has been in her sling.

## 2024-07-01 ENCOUNTER — APPOINTMENT (OUTPATIENT)
Dept: ORTHOPEDIC SURGERY | Facility: CLINIC | Age: 64
End: 2024-07-01
Payer: MEDICARE

## 2024-07-01 VITALS — HEIGHT: 61 IN | WEIGHT: 169 LBS | BODY MASS INDEX: 31.91 KG/M2

## 2024-07-01 PROBLEM — R22.32 MASS OF LEFT HAND: Status: ACTIVE | Noted: 2024-07-01

## 2024-07-01 PROCEDURE — 99213 OFFICE O/P EST LOW 20 MIN: CPT

## 2024-07-03 ENCOUNTER — RESULT REVIEW (OUTPATIENT)
Age: 64
End: 2024-07-03

## 2024-07-08 ENCOUNTER — APPOINTMENT (OUTPATIENT)
Dept: ORTHOPEDIC SURGERY | Facility: CLINIC | Age: 64
End: 2024-07-08
Payer: MEDICARE

## 2024-07-08 ENCOUNTER — APPOINTMENT (OUTPATIENT)
Dept: ORTHOPEDIC SURGERY | Facility: CLINIC | Age: 64
End: 2024-07-08

## 2024-07-08 VITALS — BODY MASS INDEX: 31.91 KG/M2 | WEIGHT: 169 LBS | HEIGHT: 61 IN

## 2024-07-08 VITALS — WEIGHT: 169 LBS | BODY MASS INDEX: 31.91 KG/M2 | HEIGHT: 61 IN

## 2024-07-08 DIAGNOSIS — S42.292A OTHER DISPLACED FRACTURE OF UPPER END OF LEFT HUMERUS, INITIAL ENCOUNTER FOR CLOSED FRACTURE: ICD-10-CM

## 2024-07-08 PROCEDURE — 73030 X-RAY EXAM OF SHOULDER: CPT | Mod: LT

## 2024-07-08 PROCEDURE — 99213 OFFICE O/P EST LOW 20 MIN: CPT

## 2024-07-10 ENCOUNTER — APPOINTMENT (OUTPATIENT)
Dept: ORTHOPEDIC SURGERY | Facility: CLINIC | Age: 64
End: 2024-07-10
Payer: MEDICARE

## 2024-07-10 VITALS — BODY MASS INDEX: 31.91 KG/M2 | HEIGHT: 61 IN | WEIGHT: 169 LBS

## 2024-07-10 DIAGNOSIS — M25.562 PAIN IN RIGHT KNEE: ICD-10-CM

## 2024-07-10 DIAGNOSIS — M25.561 PAIN IN RIGHT KNEE: ICD-10-CM

## 2024-07-10 PROCEDURE — 73564 X-RAY EXAM KNEE 4 OR MORE: CPT | Mod: 50

## 2024-07-10 PROCEDURE — 20610 DRAIN/INJ JOINT/BURSA W/O US: CPT | Mod: 50

## 2024-07-10 PROCEDURE — 99214 OFFICE O/P EST MOD 30 MIN: CPT | Mod: 25

## 2024-07-11 ENCOUNTER — APPOINTMENT (OUTPATIENT)
Dept: ORTHOPEDIC SURGERY | Facility: CLINIC | Age: 64
End: 2024-07-11

## 2024-07-15 ENCOUNTER — RESULT REVIEW (OUTPATIENT)
Age: 64
End: 2024-07-15

## 2024-07-15 ENCOUNTER — APPOINTMENT (OUTPATIENT)
Dept: ORTHOPEDIC SURGERY | Facility: CLINIC | Age: 64
End: 2024-07-15
Payer: MEDICARE

## 2024-07-15 VITALS — HEIGHT: 61 IN | BODY MASS INDEX: 31.91 KG/M2 | WEIGHT: 169 LBS

## 2024-07-15 DIAGNOSIS — R22.32 LOCALIZED SWELLING, MASS AND LUMP, LEFT UPPER LIMB: ICD-10-CM

## 2024-07-15 PROCEDURE — 99213 OFFICE O/P EST LOW 20 MIN: CPT

## 2024-07-24 ENCOUNTER — APPOINTMENT (OUTPATIENT)
Dept: ORTHOPEDIC SURGERY | Facility: CLINIC | Age: 64
End: 2024-07-24
Payer: MEDICARE

## 2024-07-24 VITALS — BODY MASS INDEX: 31.91 KG/M2 | WEIGHT: 169 LBS | HEIGHT: 61 IN

## 2024-07-24 DIAGNOSIS — M17.11 UNILATERAL PRIMARY OSTEOARTHRITIS, RIGHT KNEE: ICD-10-CM

## 2024-07-24 DIAGNOSIS — S83.242D OTHER TEAR OF MEDIAL MENISCUS, CURRENT INJURY, LEFT KNEE, SUBSEQUENT ENCOUNTER: ICD-10-CM

## 2024-07-24 DIAGNOSIS — M25.462 EFFUSION, LEFT KNEE: ICD-10-CM

## 2024-07-24 PROCEDURE — 99214 OFFICE O/P EST MOD 30 MIN: CPT

## 2024-07-24 RX ORDER — METHYLPREDNISOLONE 4 MG/1
4 TABLET ORAL
Qty: 1 | Refills: 0 | Status: COMPLETED | COMMUNITY
Start: 2024-07-24 | End: 2024-07-30

## 2024-07-24 NOTE — ASSESSMENT
[FreeTextEntry1] : Underlying pathology reviewed and treatment options discussed. 07/10/2024: B/L knee x-rays, 4 views, reveal OA.  Recommended PT and HEP to improve mechanics and reduce pain. However, she is in PT for her shoulder and would like to hold off for now. We discussed risk and benefits of CSI. Patient elected to proceed with steroid injection today - tolerated well. Ice if sore. Obtain MRI B/L to R/O tear.  Activity modification as tolerated. Questions addressed.  07/24/2024: MRI reviewed and discussed.  Reviewed her prior B/L knee x-rays, she has moderate medial compartmental OA on x-rays.  I do not believe that she is a surgical candidate currently since her pain is primarily focused on the lateral side of her knee.    Prescribed MDP.  Activity modification as tolerated. Questions addressed.  The documentation recorded by the scribe accurately reflects the service I personally performed and the decisions made by me. I, Jovan Timmonsibpaulie, attest that this documentation has been prepared under the direction and in the presence of Provider Pj Treadwell MD.  The patient was seen by Pj Treadwell MD.

## 2024-07-24 NOTE — HISTORY OF PRESENT ILLNESS
[Gradual] : gradual [Result of repetitive motion] : result of repetitive motion [9] : 9 [6] : 6 [Burning] : burning [Dull/Aching] : dull/aching [Leisure] : leisure [Work] : work [Social interactions] : social interactions [Nothing helps with pain getting better] : Nothing helps with pain getting better [Walking] : walking [Exercising] : exercising [Stairs] : stairs [Disabled] : Work status: disabled [de-identified] : 07/24/2024: She s here with MRI results of her left knee from [facility]. She reports no relief from CSI and her symptoms continue.   07/10/2024: 65 y/o female present with bilateral knee pain that has been exacerbated following a fall at home on 5/21/24. Describes medial knee pain in both knees with frequent buckling of the left. Difficulty with stairs and prolonged walking. She has been icing with little relief. History of right knee meniscus tear that has been treated conservatively with intermittent CSI. [] : Post Surgical Visit: no [FreeTextEntry5] : she fell 5/21/24  , History of rt knee had CSI in the past

## 2024-07-24 NOTE — DATA REVIEWED
[MRI] : MRI [Left] : left [Knee] : knee [Report was reviewed and noted in the chart] : The report was reviewed and noted in the chart [I independently reviewed and interpreted images and report] : I independently reviewed and interpreted images and report [FreeTextEntry1] : MRI LEFT KNEE: 07/15/2024 IMPRESSION:  Partial tear of the posterior root medial meniscus. Mild fraying of the free edge lateral meniscus.  Moderate knee joint effusion. Soft tissue edema on the anterior aspect of the knee.  MRI RIGHT KNEE 07/15/2024:  Tricompartmental degenerative changes, most pronounced in the medial femorotibial compartment.  Small knee joint effusion. Small popliteal cyst.

## 2024-07-24 NOTE — PROCEDURE
[FreeTextEntry3] : A Large joint injection was performed of the [BILATERAL KNEES]. The indication for this procedure was pain and inflammation, and patient had decreased mobility in the joint. Risks, benefits and alternatives to a steroid injection procedure were discussed; Risks outlined include but are not limited to infection, sepsis, bleeding, scarring, skin discoloration, temporary increase in pain, syncopal episode, failure to resolve symptoms, allergic reaction, symptom recurrence, and elevation of blood sugar in diabetics.. All questions were answered to the patient's apparent satisfaction and informed consent obtained. Prior to injection a 'Time Out' was conducted in accordance with [place] policy and the site and nature of procedure verified with the patient.   Procedure: The area of injection was prepared in a sterile fashion. The injection and aspiration was carried out utilizing sterile technique. The sites were prepped with alcohol, betadine, ethyl chloride sprayed topically and sterile technique used.   ( X ) 1cc of Celestone(30mg/ml) ( X ) 2cc of 1% Lidocaine   Patient tolerated the procedure well and direct pressure was applied for hemostasis. The patient was reminded of potential post-injection risks including, but not limited to, delayed hypersensitivity reactions and/or infection. Ice tonight to the injection site.

## 2024-07-24 NOTE — PHYSICAL EXAM
[Left] : left knee [Right] : right knee [NL (0)] : extension 0 degrees [5___] : hamstring 5[unfilled]/5 [] : no erythema [de-identified] : extension 3 degrees [TWNoteComboBox7] : flexion 120 degrees

## 2024-08-05 ENCOUNTER — APPOINTMENT (OUTPATIENT)
Dept: ORTHOPEDIC SURGERY | Facility: CLINIC | Age: 64
End: 2024-08-05

## 2024-08-05 PROBLEM — S42.292D OTHER CLOSED DISPLACED FRACTURE OF PROXIMAL END OF LEFT HUMERUS WITH ROUTINE HEALING, SUBSEQUENT ENCOUNTER: Status: ACTIVE | Noted: 2024-08-05

## 2024-08-05 PROCEDURE — 99213 OFFICE O/P EST LOW 20 MIN: CPT

## 2024-08-05 PROCEDURE — 73030 X-RAY EXAM OF SHOULDER: CPT | Mod: LT

## 2024-08-05 NOTE — REASON FOR VISIT
[FreeTextEntry2] : This is a 64 year old LHD female out on disability from spine surgery with left shoulder pain since 5/21/24 after falling backward down the stairs. Went to [facility] ER. Xrays and a CT scan was done. She was told she had a humeral neck fracture and was given a sling. No prior history.  There is no n/t.  She cannot take NSAIDs due to h/o brain aneurysm.  She is in PT and doing her HEP.  There is some discomfort. A recent MDP for her knee did not help her shoulder. She is getting cataract surgery 8/6/24.

## 2024-08-05 NOTE — ASSESSMENT
[FreeTextEntry1] : We discussed her course.  An injection is considered.  With her upcoming bilateral cataract surgery, she will hold PT and follow up in 3 weeks.  Pain control outlined. Questions answered.  Patient was seen by Dr. Scott Love. Patient was seen by Yani COLLINS under the supervision of Dr. Scott Love. Progress note was completed by Yani COLLINS. Entered by Laurie Wade acting as scribe.

## 2024-08-05 NOTE — HISTORY OF PRESENT ILLNESS
[6] : 6 [Dull/Aching] : dull/aching [Radiating] : radiating [] : yes [Constant] : constant [Household chores] : household chores [Leisure] : leisure [Work] : work [Sleep] : sleep [Social interactions] : social interactions [Meds] : meds [Ice] : ice [Physical therapy] : physical therapy [Disabled] : Work status: disabled [de-identified] : 08/05/2024: Patient states that she is feeling slightly better but, in some pain, still and discomfort. Patient goes to PT twice a week she feels like it is helping her. Pain is still radiating to the elbow. Patient states that PA had a script for Oxycodone, but she was not able to get it from her pharmacy due to script not being in their system.  [FreeTextEntry1] : Left Shoulder [FreeTextEntry7] : To elbow  [de-identified] : Movement

## 2024-08-05 NOTE — PHYSICAL EXAM
[Left] : left shoulder [] : no sensory deficits [de-identified] : Strength was not assessed. [TWNoteComboBox4] : passive forward flexion 80 degrees [de-identified] : external rotation 15 degrees

## 2024-08-07 ENCOUNTER — APPOINTMENT (OUTPATIENT)
Dept: ORTHOPEDIC SURGERY | Facility: CLINIC | Age: 64
End: 2024-08-07

## 2024-08-13 ENCOUNTER — APPOINTMENT (OUTPATIENT)
Dept: ORTHOPEDIC SURGERY | Facility: CLINIC | Age: 64
End: 2024-08-13
Payer: MEDICARE

## 2024-08-13 ENCOUNTER — RESULT CHARGE (OUTPATIENT)
Age: 64
End: 2024-08-13

## 2024-08-13 VITALS — BODY MASS INDEX: 32.1 KG/M2 | HEIGHT: 61 IN | WEIGHT: 170 LBS

## 2024-08-13 PROCEDURE — 99213 OFFICE O/P EST LOW 20 MIN: CPT | Mod: 25

## 2024-08-13 PROCEDURE — 99203 OFFICE O/P NEW LOW 30 MIN: CPT | Mod: 25

## 2024-08-13 PROCEDURE — 20610 DRAIN/INJ JOINT/BURSA W/O US: CPT | Mod: LT

## 2024-08-13 PROCEDURE — 73562 X-RAY EXAM OF KNEE 3: CPT | Mod: LT

## 2024-08-13 PROCEDURE — J3490M: CUSTOM | Mod: NC

## 2024-08-13 PROCEDURE — L1833: CPT | Mod: KX,LT

## 2024-08-13 RX ORDER — FAMOTIDINE 10 MG/1
TABLET, FILM COATED ORAL
Refills: 0 | Status: ACTIVE | COMMUNITY

## 2024-08-13 NOTE — IMAGING
[de-identified] : left knee:  no swelling ROM 0-90 +crepitus +med/lat joint line tenderness no gross instability NVI  xrays left knee 3 views: mild degenerative changes

## 2024-08-13 NOTE — ASSESSMENT
[FreeTextEntry1] : CSI left knee today she is advised rest and ice hinged knee brace for protected weight bearing due to feelings of instability  progress activities as tolerated f/u with Dr Treadwell in 2 weeks

## 2024-08-13 NOTE — HISTORY OF PRESENT ILLNESS
[de-identified] : 64 YF with b/l knee pain, L>R.  She is being treated by Dr Treadwell and was prescribed a MDP, which helped a bit, but pain is worsening in the left knee.  She did have a cortisone injection in the right knee last month.  Note says b/l knee injection, but patient insists that only the right knee was injected. She reports occasional instability in her knee as well. [] : no [FreeTextEntry5] : pt was getting out of a beach chair 8/11/24 and a few hrs later started having alot of pain in the lt knee. unable to take nsaid's. ice helps  a ltittle

## 2024-08-16 ENCOUNTER — APPOINTMENT (OUTPATIENT)
Dept: ORTHOPEDIC SURGERY | Facility: CLINIC | Age: 64
End: 2024-08-16

## 2024-08-16 VITALS — HEIGHT: 61 IN | BODY MASS INDEX: 32.1 KG/M2 | WEIGHT: 170 LBS

## 2024-08-16 DIAGNOSIS — M17.12 UNILATERAL PRIMARY OSTEOARTHRITIS, LEFT KNEE: ICD-10-CM

## 2024-08-16 DIAGNOSIS — M25.462 EFFUSION, LEFT KNEE: ICD-10-CM

## 2024-08-16 PROCEDURE — 99213 OFFICE O/P EST LOW 20 MIN: CPT

## 2024-08-16 RX ORDER — METHYLPREDNISOLONE 4 MG/1
4 TABLET ORAL
Qty: 1 | Refills: 0 | Status: ACTIVE | COMMUNITY
Start: 2024-08-16 | End: 1900-01-01

## 2024-08-16 RX ORDER — TRAMADOL HYDROCHLORIDE 50 MG/1
50 TABLET, COATED ORAL
Qty: 10 | Refills: 0 | Status: ACTIVE | COMMUNITY
Start: 2024-08-16 | End: 1900-01-01

## 2024-08-16 NOTE — IMAGING
[de-identified] : Left Knee Exam:                    exam limited due to pain         General: no distress, no ligamentous laxity Skin: no significant pertinent finding Inspection:  Effusion: +) ROM:  0 - 95 degrees of flexion. Tenderness:  MJLT: (++)  MFC. (-)  LJLT: (-)  Medial patellar facet tenderness: -  Lateral patellar facet tenderness: -  Crepitus: (+)  Patellar tendon: (-) Additional tests:  McMurrays test: (+ Strength: 5/5 Q/H/TA/GS/EHL Neuro: In tact to light touch throughout, DTR's wnl Vascularity: Extremity warm and well perfused Gait  antalgic w cane

## 2024-08-16 NOTE — HISTORY OF PRESENT ILLNESS
[10] : 10 [8] : 8 [Dull/Aching] : dull/aching [Localized] : localized [Constant] : constant [Standing] : standing [Walking] : walking [Stairs] : stairs [Retired] : Work status: retired [] : This patient has had an injection before: yes [Steroid] : Steroid [FreeTextEntry5] : 65yo female presenting with LT knee pain. was treated on 8/13/24 with IRAIS dozier was given a CSI, but the pain is worse and is having buckling. [de-identified] : Pt of Eben 63yo female presenting with LT knee pain. was treated on 8/13/24 with IRAIS dozier was given a CSI. Yesterday had a severe buckling episode and now pain has significantly worsened. Having difficulty walking due to frequent buckling sx.  h/o meniscus tear [FreeTextEntry1] : left knee [de-identified] : brace, inj

## 2024-08-19 RX ORDER — DICLOFENAC SODIUM 75 MG/1
75 TABLET, DELAYED RELEASE ORAL
Qty: 60 | Refills: 1 | Status: ACTIVE | COMMUNITY
Start: 2024-08-19 | End: 1900-01-01

## 2024-08-26 ENCOUNTER — APPOINTMENT (OUTPATIENT)
Dept: ORTHOPEDIC SURGERY | Facility: CLINIC | Age: 64
End: 2024-08-26
Payer: MEDICARE

## 2024-08-26 VITALS — WEIGHT: 170 LBS | HEIGHT: 61 IN | BODY MASS INDEX: 32.1 KG/M2

## 2024-08-26 DIAGNOSIS — S42.292D OTHER DISPLACED FRACTURE OF UPPER END OF LEFT HUMERUS, SUBSEQUENT ENCOUNTER FOR FRACTURE WITH ROUTINE HEALING: ICD-10-CM

## 2024-08-26 DIAGNOSIS — M75.02 ADHESIVE CAPSULITIS OF LEFT SHOULDER: ICD-10-CM

## 2024-08-26 PROCEDURE — 99214 OFFICE O/P EST MOD 30 MIN: CPT | Mod: 25

## 2024-08-26 PROCEDURE — 20610 DRAIN/INJ JOINT/BURSA W/O US: CPT | Mod: LT

## 2024-08-26 NOTE — PHYSICAL EXAM
[Left] : left shoulder [Mild] : mild [Standing] : standing [] : no sensory deficits [de-identified] : She is weak. [TWNoteComboBox4] : passive forward flexion 80 degrees [de-identified] : external rotation 15 degrees

## 2024-08-26 NOTE — ASSESSMENT
[FreeTextEntry1] : We reviewed her options. An injection is planned. She will resume HEP, including table slides. She may return to PT, when able. Repeat XRs will be taken at her next visit.  Patient was seen by Dr. Scott Love. Patient was seen by Yani COLLINS under the supervision of Dr. Scott Love. Progress note was completed by Yani COLLINS.  Procedure Name: Large Joint Injection / Aspiration: Depomedrol, Lidocaine   Large Joint Injection was performed because of pain and inflammation. Depomedrol: An injection of Depomedrol 40 mg , 2 cc. Lidocaine: An injection of Lidocaine 1 mg , 13 cc.   Medication was injected in the left subacromial space and glenohumeral joint from a posterior approach. Patient has tried OTC's including aspirin, Ibuprofen, Aleve etc or prescription NSAIDS, and/or exercises at home and/ or physical therapy without satisfactory response. The risks, benefits, and alternatives to steroid injection were explained in full to the patient. Risks outlined include but are not limited to infection, sepsis, bleeding, scarring, skin discoloration, temporary increase in pain, syncopal episode, failure to resolve symptoms, allergic reaction, symptom recurrence, and elevation of blood sugar in diabetics. Patient understood the risks. All questions were answered. After discussion, patient requested an injection. Oral informed consent was obtained.  Sterile preparation with betadine and aseptic technique was utilized for the procedure, including the preparation of the solutions used for the injection. Patient tolerated the procedure well.  Post Procedure Instructions: Patient was advised to call if redness, pain, or fever occur and apply ice for 15 min. out of every hour for the next 12-24 hours as tolerated. Patient was advised to rest the joint(s) for 3 days.  Advised to ice the injection site this evening.

## 2024-08-26 NOTE — REASON FOR VISIT
[FreeTextEntry2] : This is a 64 year old LHD female out on disability from spine surgery with left shoulder pain since 5/21/24 after falling backward down the stairs. Went to [facility] ER and was told she had a humeral neck fracture and was given a sling.  No prior history.  There is no n/t.  She cannot take NSAIDs due to h/o brain aneurysm.   She recovered well from her cataract procedure.  She has not been back to PT, yet.  The shoulder stiffness persists.  She'd like to discuss an injection.

## 2024-08-26 NOTE — HISTORY OF PRESENT ILLNESS
[6] : 6 [Dull/Aching] : dull/aching [Radiating] : radiating [] : yes [Constant] : constant [Household chores] : household chores [Leisure] : leisure [Work] : work [Sleep] : sleep [Social interactions] : social interactions [Meds] : meds [Ice] : ice [Physical therapy] : physical therapy [Disabled] : Work status: disabled [de-identified] : f/u L shoulder. still has pain. stopped therapy due to cataract sx 4 weeks ago. considering inj today [FreeTextEntry1] : Left Shoulder [FreeTextEntry7] : To elbow  [de-identified] : Movement

## 2024-08-28 ENCOUNTER — APPOINTMENT (OUTPATIENT)
Dept: ORTHOPEDIC SURGERY | Facility: CLINIC | Age: 64
End: 2024-08-28
Payer: MEDICARE

## 2024-08-28 DIAGNOSIS — M17.12 UNILATERAL PRIMARY OSTEOARTHRITIS, LEFT KNEE: ICD-10-CM

## 2024-08-28 DIAGNOSIS — S83.242D OTHER TEAR OF MEDIAL MENISCUS, CURRENT INJURY, LEFT KNEE, SUBSEQUENT ENCOUNTER: ICD-10-CM

## 2024-08-28 DIAGNOSIS — M25.462 EFFUSION, LEFT KNEE: ICD-10-CM

## 2024-08-28 PROCEDURE — 99213 OFFICE O/P EST LOW 20 MIN: CPT

## 2024-08-28 NOTE — ASSESSMENT
[FreeTextEntry1] : Underlying pathology reviewed and treatment options discussed. 07/10/2024: B/L knee x-rays, 4 views, reveal OA.  Recommended PT and HEP to improve mechanics and reduce pain. However, she is in PT for her shoulder and would like to hold off for now. We discussed risk and benefits of CSI. Patient elected to proceed with steroid injection today - tolerated well. Ice if sore. Obtain MRI B/L to R/O tear.  Activity modification as tolerated. Questions addressed.  07/24/2024: MRI reviewed and discussed.  Reviewed her prior B/L knee x-rays, she has moderate medial compartmental OA on x-rays.  I do not believe that she is a surgical candidate currently since her pain is primarily focused on the lateral side of her knee.    Prescribed MDP.  Activity modification as tolerated. Questions addressed.  08/28/2024: We discussed her options. An updated MRI is planned. Follow up after.   The documentation recorded by the scribe accurately reflects the service I personally performed and the decisions made by me. I, Lilo Timmons, attest that this documentation has been prepared under the direction and in the presence of Provider Pj Treadwell MD.  The patient was seen by Pj Treadwell MD.

## 2024-08-28 NOTE — PROCEDURE
[FreeTextEntry3] : A Large joint injection was performed of the [BILATERAL KNEES]. The indication for this procedure was pain and inflammation, and patient had decreased mobility in the joint. Risks, benefits and alternatives to a steroid injection procedure were discussed; Risks outlined include but are not limited to infection, sepsis, bleeding, scarring, skin discoloration, temporary increase in pain, syncopal episode, failure to resolve symptoms, allergic reaction, symptom recurrence, and elevation of blood sugar in diabetics.. All questions were answered to the patient's apparent satisfaction and informed consent obtained. Prior to injection a 'Time Out' was conducted in accordance with [place] policy and the site and nature of procedure verified with the patient.   Procedure: The area of injection was prepared in a sterile fashion. The injection and aspiration was carried out utilizing sterile technique. The sites were prepped with alcohol, betadine, ethyl chloride sprayed topically and sterile technique used.   ( X ) 1cc of Celestone(30mg/ml) ( X ) 2cc of 1% Lidocaine   Patient tolerated the procedure well and direct pressure was applied for hemostasis. The patient was reminded of potential post-injection risks including, but not limited to, delayed hypersensitivity reactions and/or infection. Ice tonight to the injection site.

## 2024-08-28 NOTE — PHYSICAL EXAM
[Left] : left knee [Right] : right knee [NL (0)] : extension 0 degrees [5___] : hamstring 5[unfilled]/5 [] : no erythema [de-identified] : extension 3 degrees [TWNoteComboBox7] : flexion 120 degrees

## 2024-08-28 NOTE — HISTORY OF PRESENT ILLNESS
[Gradual] : gradual [Result of repetitive motion] : result of repetitive motion [9] : 9 [6] : 6 [Burning] : burning [Dull/Aching] : dull/aching [Leisure] : leisure [Work] : work [Social interactions] : social interactions [Nothing helps with pain getting better] : Nothing helps with pain getting better [Walking] : walking [Exercising] : exercising [Stairs] : stairs [Disabled] : Work status: disabled [de-identified] : 08/28/2024: Here for follow up of her left knee. Her symptoms persist.   07/24/2024: She s here with MRI results of her left knee from [facility]. She reports no relief from CSI and her symptoms continue.   07/10/2024: 65 y/o female present with bilateral knee pain that has been exacerbated following a fall at home on 5/21/24. Describes medial knee pain in both knees with frequent buckling of the left. Difficulty with stairs and prolonged walking. She has been icing with little relief. History of right knee meniscus tear that has been treated conservatively with intermittent CSI. [] : Post Surgical Visit: no [FreeTextEntry5] : Left knee pain has gotten worse since last visit.

## 2024-09-10 ENCOUNTER — RESULT REVIEW (OUTPATIENT)
Age: 64
End: 2024-09-10

## 2024-09-18 ENCOUNTER — APPOINTMENT (OUTPATIENT)
Dept: ORTHOPEDIC SURGERY | Facility: CLINIC | Age: 64
End: 2024-09-18
Payer: MEDICARE

## 2024-09-18 VITALS — HEIGHT: 61 IN | WEIGHT: 170 LBS | BODY MASS INDEX: 32.1 KG/M2

## 2024-09-18 DIAGNOSIS — Z86.73 PERSONAL HISTORY OF TRANSIENT ISCHEMIC ATTACK (TIA), AND CEREBRAL INFARCTION W/OUT RESIDUAL DEFICITS: ICD-10-CM

## 2024-09-18 DIAGNOSIS — S83.242D OTHER TEAR OF MEDIAL MENISCUS, CURRENT INJURY, LEFT KNEE, SUBSEQUENT ENCOUNTER: ICD-10-CM

## 2024-09-18 DIAGNOSIS — Z87.39 PERSONAL HISTORY OF OTHER DISEASES OF THE MUSCULOSKELETAL SYSTEM AND CONNECTIVE TISSUE: ICD-10-CM

## 2024-09-18 DIAGNOSIS — M17.12 UNILATERAL PRIMARY OSTEOARTHRITIS, LEFT KNEE: ICD-10-CM

## 2024-09-18 DIAGNOSIS — Z95.818 PRESENCE OF OTHER CARDIAC IMPLANTS AND GRAFTS: ICD-10-CM

## 2024-09-18 PROCEDURE — 20610 DRAIN/INJ JOINT/BURSA W/O US: CPT | Mod: LT

## 2024-09-18 PROCEDURE — 99214 OFFICE O/P EST MOD 30 MIN: CPT | Mod: 25

## 2024-09-18 NOTE — ASSESSMENT
[FreeTextEntry1] : Underlying pathology reviewed and treatment options discussed. 07/10/2024: B/L knee x-rays, 4 views, reveal OA.  Recommended PT and HEP to improve mechanics and reduce pain. However, she is in PT for her shoulder and would like to hold off for now. We discussed risk and benefits of CSI. Patient elected to proceed with steroid injection today - tolerated well. Ice if sore. Obtain MRI B/L to R/O tear.  Activity modification as tolerated. Questions addressed.  07/24/2024: MRI reviewed and discussed.  Reviewed her prior B/L knee x-rays, she has moderate medial compartmental OA on x-rays.  I do not believe that she is a surgical candidate currently since her pain is primarily focused on the lateral side of her knee.    Prescribed MDP.  Activity modification as tolerated. Questions addressed.  08/28/2024: We discussed her options. An updated MRI is planned. Follow up after.   09/18/2024: [facility] MRI reviewed. All of her pain is now located medially due to meniscus tear. Underlying pathology reviewed and treatment options discussed. Activity modification as tolerated. Discussed the r/b/a to aspiration and injections and she wishes to proceed. CSI L knee, christine well Discussed the r/b/a to partial medial meniscectomy surgery and she wishes to hold off for now. She will do asp/csi prior to considering it. Questions addressed.   The documentation recorded by the scribe accurately reflects the service I personally performed and the decisions made by me. I, Lilo Nunez, attest that this documentation has been prepared under the direction and in the presence of Provider Pj Treadwell MD.   The patient was seen by Pj Treadwell MD.  
Patient information on fall and injury prevention

## 2024-09-18 NOTE — DATA REVIEWED
[FreeTextEntry1] : [facility]UNIL[facility] MRI 9/10/24: 1. Complex appearing tear along the anterior margin of the posterior horn of the medial meniscus. Probable small vertically oriented tears along the tibial articular surface of the body of the meniscus. 2. Linear intrasubstance appearing tear within proximal fibers of the MCL. Intact fibers are still seen. 3. Osteoarthritic appearing changes, most pronounced in the medial tibiofemoral compartment. 4. Moderate size joint effusion

## 2024-09-18 NOTE — PHYSICAL EXAM
[Left] : left knee [Right] : right knee [NL (0)] : extension 0 degrees [5___] : hamstring 5[unfilled]/5 [] : no erythema [de-identified] : extension 3 degrees [TWNoteComboBox7] : flexion 120 degrees

## 2024-09-18 NOTE — PROCEDURE
[FreeTextEntry3] :  A Large joint injection was performed of the [LEFT KNEE]. The indication for this procedure was pain and inflammation, and patient had decreased mobility in the joint. Risks, benefits and alternatives to a steroid injection procedure were discussed; Risks outlined include but are not limited to infection, sepsis, bleeding, scarring, skin discoloration, temporary increase in pain, syncopal episode, failure to resolve symptoms, allergic reaction, symptom recurrence, and elevation of blood sugar in diabetics.. All questions were answered to the patient's apparent satisfaction and informed consent obtained. Prior to injection a 'Time Out' was conducted in accordance with [place] policy and the site and nature of procedure verified with the patient.    Procedure: The area of injection was prepared in a sterile fashion. The injection and aspiration was carried out utilizing sterile technique. The site was prepped with alcohol, betadine, ethyl chloride sprayed topically and sterile technique used.   ( X ) 1cc of Celestone(30mg/ml)  ( X ) 2cc of 1% Lidocaine   Patient tolerated the procedure well and direct pressure was applied for hemostasis. The patient was reminded of potential post-injection risks including, but not limited to, delayed hypersensitivity reactions and/or infection. Ice tonight to the injection site.

## 2024-09-18 NOTE — HISTORY OF PRESENT ILLNESS
[Gradual] : gradual [Result of repetitive motion] : result of repetitive motion [9] : 9 [6] : 6 [Burning] : burning [Dull/Aching] : dull/aching [Leisure] : leisure [Work] : work [Social interactions] : social interactions [Nothing helps with pain getting better] : Nothing helps with pain getting better [Walking] : walking [Exercising] : exercising [Stairs] : stairs [Disabled] : Work status: disabled [de-identified] : 09/18/2024: Here to review MRI results. Symptoms continue.  08/28/2024: Here for follow up of her left knee. Her symptoms persist.   07/24/2024: She s here with MRI results of her left knee from [facility]. She reports no relief from CSI and her symptoms continue.   07/10/2024: 65 y/o female present with bilateral knee pain that has been exacerbated following a fall at home on 5/21/24. Describes medial knee pain in both knees with frequent buckling of the left. Difficulty with stairs and prolonged walking. She has been icing with little relief. History of right knee meniscus tear that has been treated conservatively with intermittent CSI. [] : Post Surgical Visit: no [FreeTextEntry5] : Left knee pain has gotten worse since last visit.

## 2024-09-30 ENCOUNTER — APPOINTMENT (OUTPATIENT)
Dept: ORTHOPEDIC SURGERY | Facility: CLINIC | Age: 64
End: 2024-09-30
Payer: MEDICARE

## 2024-09-30 DIAGNOSIS — S42.292D OTHER DISPLACED FRACTURE OF UPPER END OF LEFT HUMERUS, SUBSEQUENT ENCOUNTER FOR FRACTURE WITH ROUTINE HEALING: ICD-10-CM

## 2024-09-30 PROCEDURE — 99213 OFFICE O/P EST LOW 20 MIN: CPT

## 2024-09-30 PROCEDURE — 73010 X-RAY EXAM OF SHOULDER BLADE: CPT | Mod: LT

## 2024-09-30 NOTE — ASSESSMENT
[FreeTextEntry1] : We reviewed the healing. The XRs were discussed. The stiffness was outlined. An MRI is planned.  The physical therapy limits were discussed.  PT will continue per her request.  Questions answered.   Patient seen by Dr. Scott Love, who determined the assessment and plan. Yani COLLINS was present for and participated in aspects of the office encounter. ILaurie, am scribing for Dr. Scott Love in his presence for the chief complaint, physical exam, studies, assessment, and/or plan.

## 2024-09-30 NOTE — REASON FOR VISIT
[FreeTextEntry2] : This is a 64 year old LHD female out on disability from spine surgery with left shoulder pain since 5/21/24 after falling backward down the stairs. Went to [facility] ER and was told she had a humeral neck fracture and was given a sling.  No prior history.  There is no n/t.  She cannot take NSAIDs due to h/o brain aneurysm.   The SA/GH injection on 8/26/24 with PT helped some.  There is definitely less pain, but she is still stiff.

## 2024-09-30 NOTE — PHYSICAL EXAM
[Left] : left shoulder [Standing] : standing [Minimal] : minimal [] : no sensory deficits [FreeTextEntry8] : This is sore. [de-identified] : She is weak. [TWNoteComboBox4] : passive forward flexion 90 degrees [de-identified] : external rotation 25 degrees

## 2024-09-30 NOTE — IMAGING
[Left] : left shoulder [FreeTextEntry1] : There is further healing. The alignment is maintained with good callous formation.

## 2024-10-01 ENCOUNTER — RESULT REVIEW (OUTPATIENT)
Age: 64
End: 2024-10-01

## 2024-10-02 ENCOUNTER — APPOINTMENT (OUTPATIENT)
Dept: ORTHOPEDIC SURGERY | Facility: CLINIC | Age: 64
End: 2024-10-02
Payer: MEDICARE

## 2024-10-02 VITALS — HEIGHT: 61 IN | WEIGHT: 170 LBS | BODY MASS INDEX: 32.1 KG/M2

## 2024-10-02 DIAGNOSIS — M75.02 ADHESIVE CAPSULITIS OF LEFT SHOULDER: ICD-10-CM

## 2024-10-02 DIAGNOSIS — S42.295G: ICD-10-CM

## 2024-10-02 PROCEDURE — 99214 OFFICE O/P EST MOD 30 MIN: CPT

## 2024-10-02 RX ORDER — CELECOXIB 200 MG/1
200 CAPSULE ORAL DAILY
Qty: 30 | Refills: 0 | Status: ACTIVE | COMMUNITY
Start: 2024-10-02 | End: 1900-01-01

## 2024-10-02 NOTE — PHYSICAL EXAM
[Left] : left shoulder [Standing] : standing [Minimal] : minimal [] : no sensory deficits [FreeTextEntry8] : This is sore. [de-identified] : She is weak. [TWNoteComboBox4] : passive forward flexion 90 degrees [de-identified] : external rotation 25 degrees

## 2024-10-02 NOTE — DATA REVIEWED
[FreeTextEntry1] : [facility]P MRI L SHOULDER 10/1/24: I reviewed the images, and my interpretation is that there is incomplete humeral fracture.  There is an unhealed line extending through the proximal humerus indicating a nonhealed fracture.  There are lateral fracture cystic changes. 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 There is capsular contracture. There are slight muscle changes. There are no cuff or biceps tears.  Plan  1. Opiate withdrawal  - COWS score this morning 14 - moderate withdrawal, continue to monitor  - this morning @8 received 4mg IV zofran, received 650mg tylenol for abd pain (PRN)  - continue clonidine 0.1mg PRN for worsening sympathetic symptoms - watch BP/HR  - buprenorphine/naloxone 2/.5mg -- next dose this morning given moderate COWS score  - contact addiction specialist today  2. Prophylaxis and screening  - enoxaparin subq for DVT prophylaxis  - HIV testing  3. Nicotine dependence  - pt smokes 4-5 cigs/day, continue with daily nicotine patch Plan  1. Opiate withdrawal  - COWS score this morning 14 - moderate withdrawal, continue to monitor  - this morning @8 received 4mg IV zofran, received 650mg tylenol for abd pain (PRN)  - continue clonidine 0.1mg PRN for worsening sympathetic symptoms - watch BP/HR  - buprenorphine/naloxone 2/.5mg -- next dose this morning given moderate COWS score  - contact addiction specialist today - spoke with psych for consult  2. Prophylaxis and screening  - enoxaparin subq for DVT prophylaxis  - HIV testing  3. Nicotine dependence  - pt smokes 4-5 cigs/day, continue with daily nicotine patch Plan  1. Opiate withdrawal  - COWS score this morning 14 - moderate withdrawal, continue to monitor  - this morning @8 received 4mg IV zofran, received 650mg tylenol for abd pain (PRN)  - continue clonidine 0.1mg PRN for worsening sympathetic symptoms - watch BP/HR  - buprenorphine/naloxone 2/.5mg -- next dose this morning given moderate COWS score  - contact addiction specialist today - spoke with psych for consult  - ECG - assess QTc given ondansetron use  2. Prophylaxis and screening  - enoxaparin subq for DVT prophylaxis  - HIV testing  3. Nicotine dependence  - pt smokes 4-5 cigs/day, continue with daily nicotine patch Plan  1. Opiate withdrawal  - COWS score this morning 14 - moderate withdrawal, continue to monitor  - this morning @8 received 4mg IV zofran, received 650mg tylenol for abd pain (PRN)  - continue clonidine 0.1mg PRN for worsening sympathetic symptoms - watch BP/HR  - buprenorphine/naloxone 2/.5mg -- dose @8 this morning given moderate COWS score   - contact addiction specialist today - spoke with psych for consult  - ECG - assess QTc given ondansetron use  2. Prophylaxis and screening  - enoxaparin subq for DVT prophylaxis  - HIV testing  3. Nicotine dependence  - pt smokes 4-5 cigs/day, continue with daily nicotine patch Plan  1. Opiate withdrawal  - COWS score this morning 14 - moderate withdrawal, continue to monitor  - this morning @8 received 4mg IV zofran, received 650mg tylenol for abd pain (PRN)  - buprenorphine/naloxone 2/.5mg -- dose @8 this morning given moderate COWS score   - ECG - assess QTc given ondansetron use  - Psychiatry consult - recommends Suboxone 8mg/2mg TID starting 8/11. Zofran 4mg q8h PRN vomiting/nausea max 12mg in 24hrs, Imodium 2mg q12h PRN diarrhea, until  improvement of symptoms, clonidine 0.1mg -0.2mg q12h PRN (HOLD for SBP <100/60 ), Flexeril 10mg q12h PRN muscle spasms, Ibuprofen 400mg q6h PRN pain alternatively 800mg q8h PRN w/ food.  - call psych tomorrow for the suboxone schedule for 8/12.  - consult for SBIRT is in  2. Prophylaxis and screening  - enoxaparin subq for DVT prophylaxis  - HIV testing  3. Nicotine dependence  - pt smokes 4-5 cigs/day, continue with daily nicotine patch Plan  1. Opiate withdrawal  - COWS score this morning 14 - moderate withdrawal, continue to monitor  - this morning @8 received 4mg IV zofran, received 650mg tylenol for abd pain (PRN)  - buprenorphine/naloxone 2/.5mg -- dose @8 this morning given moderate COWS score   - ECG - assess QTc given ondansetron use  - Psychiatry consult - recommends Suboxone 8mg/2mg TID starting 8/11. Zofran 4mg q8h PRN vomiting/nausea max 12mg in 24hrs, Imodium 2mg q12h PRN diarrhea, until  improvement of symptoms, clonidine 0.1mg -0.2mg q12h PRN (HOLD for SBP <100/60 ), Flexeril 10mg q12h PRN muscle spasms, Ibuprofen 400mg q6h PRN pain alternatively 800mg q8h PRN w/ food.  - call psych tomorrow for the suboxone schedule for 8/12.  - consult for SBIRT is in  - [facility] [phone]  2. Prophylaxis and screening  - enoxaparin subq for DVT prophylaxis  - HIV testing  3. Nicotine dependence  - pt smokes 4-5 cigs/day, continue with daily nicotine patch

## 2024-10-02 NOTE — HISTORY OF PRESENT ILLNESS
[8] : 8 [5] : 5 [Throbbing] : throbbing [Tightness] : tightness [] : yes [Constant] : constant [Household chores] : household chores [Leisure] : leisure [Sleep] : sleep [Ice] : ice [Disabled] : Work status: disabled [de-identified] : Patient is here for MRI results of her left shoulder. [FreeTextEntry1] : Left shoulder [FreeTextEntry7] : to her elbow [de-identified] : movement [de-identified] : HEP

## 2024-10-02 NOTE — ASSESSMENT
[FreeTextEntry1] : We reviewed her course. We reviewed the MRI findings.  Medication use discussed.  Celebrex 200mg is prescribed.  A bone stimulator is planned.   PT is planned.  Names in [place] were provided to her.  Questions answered.   Patient seen by Dr. Scott Love, who determined the assessment and plan. Yani COLLINS was present for and participated in aspects of the office encounter. ILaurie, am scribing for Dr. Scott Love in his presence for the chief complaint, physical exam, studies, assessment, and/or plan.

## 2024-10-02 NOTE — REASON FOR VISIT
[FreeTextEntry2] : This is a 64 year old LHD female out on disability from spine surgery with left shoulder pain since 5/21/24 after falling backward down the stairs. Went to [facility] and was told she had a humeral neck fracture and was given a sling.  No prior history.  There is no n/t.  She cannot take NSAIDs due to h/o brain aneurysm.   The SA/GH injection on 8/26/24 with PT helped some.  There is definitely less pain, but she is still stiff. The MRI was done 10/1/24.

## 2024-10-02 NOTE — DATA REVIEWED
[FreeTextEntry1] : ZP MRI L SHOULDER 10/1/24: I reviewed the images, and my interpretation is that there is incomplete humeral fracture.  There is an unhealed line extending through the proximal humerus indicating a nonhealed fracture.  There are lateral fracture cystic changes. 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 There is capsular contracture. There are slight muscle changes. There are no cuff or biceps tears.

## 2024-10-02 NOTE — HISTORY OF PRESENT ILLNESS
[8] : 8 [5] : 5 [Throbbing] : throbbing [Tightness] : tightness [] : yes [Constant] : constant [Household chores] : household chores [Leisure] : leisure [Sleep] : sleep [Ice] : ice [Disabled] : Work status: disabled [de-identified] : Patient is here for MRI results of her left shoulder. [FreeTextEntry1] : Left shoulder [FreeTextEntry7] : to her elbow [de-identified] : movement [de-identified] : HEP

## 2024-10-02 NOTE — REASON FOR VISIT
[FreeTextEntry2] : This is a 64 year old LHD female out on disability from spine surgery with left shoulder pain since 5/21/24 after falling backward down the stairs. Went to [facility] and was told she had a humeral neck fracture and was given a sling.  No prior history.  There is no n/t.  She cannot take NSAIDs due to h/o brain aneurysm.   The SA/GH injection on 8/26/24 with PT helped some.  There is definitely less pain, but she is still stiff. The MRI was done 10/1/24.

## 2024-10-02 NOTE — PHYSICAL EXAM
[Left] : left shoulder [Standing] : standing [Minimal] : minimal [] : no sensory deficits [FreeTextEntry8] : This is sore. [de-identified] : She is weak. [TWNoteComboBox4] : passive forward flexion 90 degrees [de-identified] : external rotation 25 degrees

## 2025-01-21 ENCOUNTER — NON-APPOINTMENT (OUTPATIENT)
Age: 65
End: 2025-01-21

## 2025-01-22 ENCOUNTER — RESULT CHARGE (OUTPATIENT)
Age: 65
End: 2025-01-22

## 2025-01-22 ENCOUNTER — APPOINTMENT (OUTPATIENT)
Dept: ORTHOPEDIC SURGERY | Facility: CLINIC | Age: 65
End: 2025-01-22

## 2025-01-22 ENCOUNTER — APPOINTMENT (OUTPATIENT)
Dept: ORTHOPEDIC SURGERY | Facility: CLINIC | Age: 65
End: 2025-01-22
Payer: MEDICARE

## 2025-01-22 VITALS — BODY MASS INDEX: 32.1 KG/M2 | HEIGHT: 61 IN | WEIGHT: 170 LBS

## 2025-01-22 DIAGNOSIS — M75.02 ADHESIVE CAPSULITIS OF LEFT SHOULDER: ICD-10-CM

## 2025-01-22 DIAGNOSIS — S42.295G: ICD-10-CM

## 2025-01-22 PROCEDURE — 20611 DRAIN/INJ JOINT/BURSA W/US: CPT | Mod: LT

## 2025-01-22 PROCEDURE — 99214 OFFICE O/P EST MOD 30 MIN: CPT | Mod: 25

## 2025-01-22 NOTE — REASON FOR VISIT
[FreeTextEntry2] : This is a 64 year old LHD female out on disability from spine surgery with left shoulder pain since 5/21/24 after falling backward down the stairs. Went to [facility] and was told she had a humeral neck fracture and was given a sling.  No prior history.  There is no n/t.  She cannot take NSAIDs due to h/o brain aneurysm.   The SA/GH injection on 8/26/24 with PT helped some.  There is definitely less pain, but she is still stiff. The MRI was done 10/1/24.  She has been using the bone stimulator.  There is still pain.

## 2025-01-22 NOTE — HISTORY OF PRESENT ILLNESS
[7] : 7 [6] : 6 [Dull/Aching] : dull/aching [Constant] : constant [Household chores] : household chores [Leisure] : leisure [Sleep] : sleep [Ice] : ice [Disabled] : Work status: disabled [de-identified] : Patient is here for a follow up on her left shoulder. [] : no [FreeTextEntry1] : Left shoulder [de-identified] : activity, reaching [de-identified] : Physical therapy 2 x a week, HEP

## 2025-01-22 NOTE — PHYSICAL EXAM
[Left] : left shoulder [Standing] : standing [Mild] : mild [] : no sensory deficits [de-identified] : She is weak. [de-identified] : +arc [TWNoteComboBox4] : passive forward flexion 105 degrees [de-identified] : external rotation 35 degrees

## 2025-01-22 NOTE — ASSESSMENT
[FreeTextEntry1] : We reviewed her options. On the XR, there appears to be healing. A repeat MRI is planned. She will do this while in [place]. She understands she needs a LOOP compatible machine. An injection is planned today. We discussed the timing of the injection with the imaging. Follow up in the spring planned. We reviewed the likelihood of surgery.  Patient seen by Unique MARTINI who determined the assessment and plan and participated in all aspects of the office encounter.  Procedure Name: Large Joint Injection / Aspiration: Depomedrol, Lidocaine and Guidance Ultrasound   Large Joint Injection was performed because of pain and inflammation. Depomedrol: An injection of Depomedrol 40 mg , 2 cc. Lidocaine: An injection of Lidocaine 1 mg , 13 cc.   Medication was injected in the left subacromial space and glenohumeral joint from a posterior approach. Patient has tried OTC's including aspirin, Ibuprofen, Aleve etc or prescription NSAIDS, and/or exercises at home and/ or physical therapy without satisfactory response. The risks, benefits, and alternatives to steroid injection were explained in full to the patient. Risks outlined include but are not limited to infection, sepsis, bleeding, scarring, skin discoloration, temporary increase in pain, syncopal episode, failure to resolve symptoms, allergic reaction, symptom recurrence, and elevation of blood sugar in diabetics. Patient understood the risks. All questions were answered. After discussion, patient requested an injection. Oral informed consent was obtained.  Sterile preparation with betadine and aseptic technique was utilized for the procedure, including the preparation of the solutions used for the injection. Patient tolerated the procedure well.  Post Procedure Instructions: Patient was advised to call if redness, pain, or fever occur and apply ice for 15 min. out of every hour for the next 12-24 hours as tolerated. Patient was advised to rest the joint(s) for 3 days.  Advised to ice the injection site this evening. Ultrasound Guidance was used for the following reasons: for precise injection in area of tear. Visualization of the needle and placement of injection was performed without complication.

## 2025-02-11 ENCOUNTER — RESULT REVIEW (OUTPATIENT)
Age: 65
End: 2025-02-11

## 2025-03-28 ENCOUNTER — APPOINTMENT (OUTPATIENT)
Dept: ORTHOPEDIC SURGERY | Facility: CLINIC | Age: 65
End: 2025-03-28
Payer: MEDICARE

## 2025-03-28 VITALS — BODY MASS INDEX: 55.32 KG/M2 | WEIGHT: 293 LBS | HEIGHT: 61 IN

## 2025-03-28 DIAGNOSIS — M87.9 OSTEONECROSIS, UNSPECIFIED: ICD-10-CM

## 2025-03-28 DIAGNOSIS — M75.02 ADHESIVE CAPSULITIS OF LEFT SHOULDER: ICD-10-CM

## 2025-03-28 DIAGNOSIS — S42.295G: ICD-10-CM

## 2025-03-28 PROCEDURE — 99214 OFFICE O/P EST MOD 30 MIN: CPT

## 2025-03-28 RX ORDER — MELOXICAM 15 MG/1
15 TABLET ORAL
Qty: 30 | Refills: 0 | Status: ACTIVE | COMMUNITY
Start: 2025-03-28 | End: 1900-01-01

## 2025-03-28 NOTE — DATA REVIEWED
[FreeTextEntry1] : ZP MRI L SHOULDER 10/1/24: I reviewed the images, and my interpretation is that there is incomplete humeral fracture.  There is an unhealed line extending through the proximal humerus indicating a nonhealed fracture.  There are lateral fracture cystic changes. 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 There is capsular contracture. There are slight muscle changes. There are no cuff or biceps tears.   OC MRI L shoulder 02/11/2025:  I independently reviewed the images for the first time on 03/28/2025, and the clinically significant findings include the fracture and AVN are unchanged. There is a thicken capsule to me. The muscle is pretty decent.

## 2025-03-28 NOTE — PHYSICAL EXAM
[Left] : left shoulder [Mild] : mild [Sitting] : sitting [5 ___] : forward flexion 5[unfilled]/5 [] : no sensory deficits [de-identified] : She is weak. [de-identified] : +arc [TWNoteComboBox4] : passive forward flexion 140 degrees [TWNoteComboBox6] : internal rotation L4 [de-identified] : external rotation 45 degrees

## 2025-03-28 NOTE — HISTORY OF PRESENT ILLNESS
[7] : 7 [6] : 6 [Dull/Aching] : dull/aching [Constant] : constant [Household chores] : household chores [Leisure] : leisure [Sleep] : sleep [Ice] : ice [Disabled] : Work status: disabled [de-identified] : Patient is here for a follow up on her left shoulder. [] : Post Surgical Visit: no [FreeTextEntry1] : Left shoulder [de-identified] : activity, reaching [de-identified] : none

## 2025-03-28 NOTE — ASSESSMENT
[FreeTextEntry1] : _____ We reviewed MRI and her course.  She is not as stiff but is stilll functionally limited.  Meloxicam 15 mg QD x 10-14 days is prescribed with risks of GI symptoms reviewed. Physical Therapy is prescribed, 2x/week for 4-6 weeks. She inquires about another injection, which we can consider at follow up. Questions answered.   Patient was seen by Dr. Mario Beck, who determined the assessment and plan.  Leeann BARRAGAN, am scribing for Dr. Mario Beck in his presence for the chief complaint, physical exam, studies, assessment, and/or plan.

## 2025-03-28 NOTE — REASON FOR VISIT
[FreeTextEntry2] : This is a 64 year old LHD female out on disability from spine surgery with left shoulder pain since 5/21/24 after falling backward down the stairs. Went to [facility] and was told she had a humeral neck fracture and was given a sling.  No prior history.  There is no n/t.  She cannot take NSAIDs due to h/o brain aneurysm.   The SA/GH injection on 8/26/24 with PT helped some.  There is definitely less pain, but she is still stiff. The MRI was done 10/1/24.  She has been using the bone stimulator.  There is still pain. On 03/28/2025: Patient feels 75% better. The Jan 2025 LT SA/GH joint gave relief. Has continued with HEP. Has not started PT yet, since Oct 2024. No medication use. Had MRI done on 02/11/2025.

## 2025-05-09 ENCOUNTER — APPOINTMENT (OUTPATIENT)
Dept: ORTHOPEDIC SURGERY | Facility: CLINIC | Age: 65
End: 2025-05-09
Payer: MEDICARE

## 2025-05-09 DIAGNOSIS — S42.295G: ICD-10-CM

## 2025-05-09 DIAGNOSIS — M87.9 OSTEONECROSIS, UNSPECIFIED: ICD-10-CM

## 2025-05-09 DIAGNOSIS — M75.02 ADHESIVE CAPSULITIS OF LEFT SHOULDER: ICD-10-CM

## 2025-05-09 PROCEDURE — 99214 OFFICE O/P EST MOD 30 MIN: CPT | Mod: 25

## 2025-05-09 PROCEDURE — 20611 DRAIN/INJ JOINT/BURSA W/US: CPT | Mod: LT

## 2025-05-09 NOTE — REVIEW OF SYSTEMS
----- Message from Daniela Weston sent at 2/24/2009 12:36 PM CST -----  Regarding: colon tracking  2/23/09- normal colonoscopy. Repeat in 10 years.Surg hx updated and pt placed on reminder list.   [Joint Pain] : joint pain [Joint Stiffness] : joint stiffness [Negative] : Heme/Lymph [Joint Swelling] : no joint swelling

## 2025-05-09 NOTE — HISTORY OF PRESENT ILLNESS
[5] : 5 [Dull/Aching] : dull/aching [Localized] : localized [Constant] : constant [Household chores] : household chores [Leisure] : leisure [Work] : work [Sleep] : sleep [Social interactions] : social interactions [Nothing helps with pain getting better] : Nothing helps with pain getting better [Disabled] : Work status: disabled [] : no [FreeTextEntry1] : Left Shoulder [de-identified] : Movement  [de-identified] : Patient still having pain in the shoulder.

## 2025-05-09 NOTE — ASSESSMENT
[FreeTextEntry1] : _____ This is chronic with exacerbation as well as acute. An injection is planned today.  Meloxicam 15 mg QD x 10-14 days is prescribed with risks of GI symptoms reviewed. She will continue with PT. Physical Therapy is prescribed, 1-2x/week for 4-6 weeks. Questions answered.  Follow up in 6 weeks with repeat x-rays.  Patient seen by Dr. Mario Beck, who determined the assessment and plan. Unique MARTINI participated in the care of the patient, including the history and physical exam.  Leeann BARRAGAN, am scribing for Dr. Mario Beck in his presence for the chief complaint, physical exam, studies, assessment, and/or plan.  __ Procedure Name: Large Joint Injection: Depomedrol, Lidocaine and Guidance Ultrasound   Large Joint Injection was performed because of pain and inflammation. Depomedrol: An injection of Depomedrol 40 mg/cc, 2 cc. Lidocaine: An injection of Lidocaine 1 mg/cc, 13 cc.   Ultrasound Guidance was used for the following reasons: for precise injection in the glenohumeral joint. Visualization of the needle and placement of injection was performed without complication. Imaging saved.   Medication was injected in the left subacromial space and glenohumeral joint from a posterior approach using a 22G needle. The risks, benefits, and alternatives to steroid injection were explained in full to the patient. Risks outlined include but are not limited to infection, sepsis, bleeding, scarring, skin discoloration, temporary increase in pain, syncopal episode, failure to resolve symptoms, allergic reaction, symptom recurrence, and elevation of blood sugar in diabetics. Patient understood the risks. All questions were answered. After discussion, patient requested an injection. Oral informed consent was obtained. Sterile preparation with betadine and aseptic technique was utilized for the procedure, including the preparation of the solutions used for the injection. Patient tolerated the procedure well. Post Procedure Instructions: Patient was advised to call if redness, pain, or fever occur and apply ice for 15 min. out of every hour for the next 12-24 hours as tolerated. Patient was advised to rest the joint for 3 days. Advised to ice the injection site this evening.

## 2025-05-09 NOTE — PHYSICAL EXAM
[Left] : left shoulder [Sitting] : sitting [Mild] : mild [5 ___] : forward flexion 5[unfilled]/5 [] : no sensory deficits [de-identified] : She is weak. [de-identified] : +arc [TWNoteComboBox4] : passive forward flexion 150 degrees [TWNoteComboBox6] : internal rotation L4 [de-identified] : external rotation 50 degrees

## 2025-05-09 NOTE — DATA REVIEWED
[FreeTextEntry1] : --previously reviewed-- ZP MRI L SHOULDER 10/1/24: I reviewed the images, and my interpretation is that there is incomplete humeral fracture.  There is an unhealed line extending through the proximal humerus indicating a nonhealed fracture.  There are lateral fracture cystic changes. 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 There is capsular contracture. There are slight muscle changes. There are no cuff or biceps tears.   OC MRI L shoulder 02/11/2025:  I independently reviewed the images for the first time on 03/28/2025, and the clinically significant findings include the fracture and AVN are unchanged. There is a thicken capsule to me. The muscle is pretty decent.

## 2025-05-09 NOTE — REASON FOR VISIT
[FreeTextEntry2] : This is a 64 year old LHD female out on disability from spine surgery with left shoulder pain since 5/21/24 after falling backward down the stairs. Went to [facility] and was told she had a humeral neck fracture and was given a sling.  No prior history.  There is no n/t.  She cannot take NSAIDs due to h/o brain aneurysm.   The SA/GH injection on 8/26/24 with PT helped some.  There is definitely less pain, but she is still stiff. The MRI was done 10/1/24.  She has been using the bone stimulator.  There is still pain. On 03/28/2025: Patient feels 75% better. The Jan 2025 LT SA/GH joint gave relief. Has continued with HEP. Has not started PT yet, since Oct 2024. No medication use. Had MRI done on 02/11/2025. On 5/9/25, she reports constant throbbing with lack of ROM. Attending PT 2x/week. Took Meloxicam which has been helpful to a degree.

## 2025-05-13 ENCOUNTER — NON-APPOINTMENT (OUTPATIENT)
Age: 65
End: 2025-05-13

## 2025-05-15 ENCOUNTER — APPOINTMENT (OUTPATIENT)
Dept: INTERNAL MEDICINE | Facility: CLINIC | Age: 65
End: 2025-05-15
Payer: MEDICARE

## 2025-05-15 VITALS
HEIGHT: 60.5 IN | WEIGHT: 180 LBS | OXYGEN SATURATION: 98 % | RESPIRATION RATE: 16 BRPM | SYSTOLIC BLOOD PRESSURE: 147 MMHG | HEART RATE: 64 BPM | TEMPERATURE: 97.4 F | BODY MASS INDEX: 34.43 KG/M2 | DIASTOLIC BLOOD PRESSURE: 71 MMHG

## 2025-05-15 DIAGNOSIS — E66.811 OBESITY, CLASS 1: ICD-10-CM

## 2025-05-15 DIAGNOSIS — Z87.898 PERSONAL HISTORY OF OTHER SPECIFIED CONDITIONS: ICD-10-CM

## 2025-05-15 DIAGNOSIS — I67.1 CEREBRAL ANEURYSM, NONRUPTURED: ICD-10-CM

## 2025-05-15 DIAGNOSIS — I10 ESSENTIAL (PRIMARY) HYPERTENSION: ICD-10-CM

## 2025-05-15 DIAGNOSIS — R10.9 UNSPECIFIED ABDOMINAL PAIN: ICD-10-CM

## 2025-05-15 DIAGNOSIS — S42.292D OTHER DISPLACED FRACTURE OF UPPER END OF LEFT HUMERUS, SUBSEQUENT ENCOUNTER FOR FRACTURE WITH ROUTINE HEALING: ICD-10-CM

## 2025-05-15 DIAGNOSIS — J45.20 MILD INTERMITTENT ASTHMA, UNCOMPLICATED: ICD-10-CM

## 2025-05-15 DIAGNOSIS — B00.2 HERPESVIRAL GINGIVOSTOMATITIS AND PHARYNGOTONSILLITIS: ICD-10-CM

## 2025-05-15 DIAGNOSIS — K21.9 GASTRO-ESOPHAGEAL REFLUX DISEASE W/OUT ESOPHAGITIS: ICD-10-CM

## 2025-05-15 DIAGNOSIS — E66.09 OBESITY, CLASS 1: ICD-10-CM

## 2025-05-15 DIAGNOSIS — S30.1XXA CONTUSION OF ABDOMINAL WALL, INITIAL ENCOUNTER: ICD-10-CM

## 2025-05-15 DIAGNOSIS — Z23 ENCOUNTER FOR IMMUNIZATION: ICD-10-CM

## 2025-05-15 DIAGNOSIS — Z00.00 ENCOUNTER FOR GENERAL ADULT MEDICAL EXAMINATION W/OUT ABNORMAL FINDINGS: ICD-10-CM

## 2025-05-15 DIAGNOSIS — M17.11 UNILATERAL PRIMARY OSTEOARTHRITIS, RIGHT KNEE: ICD-10-CM

## 2025-05-15 DIAGNOSIS — Z87.42 PERSONAL HISTORY OF OTHER DISEASES OF THE FEMALE GENITAL TRACT: ICD-10-CM

## 2025-05-15 DIAGNOSIS — J45.909 UNSPECIFIED ASTHMA, UNCOMPLICATED: ICD-10-CM

## 2025-05-15 DIAGNOSIS — Z13.21 ENCOUNTER FOR SCREENING FOR NUTRITIONAL DISORDER: ICD-10-CM

## 2025-05-15 DIAGNOSIS — B37.9 CANDIDIASIS, UNSPECIFIED: ICD-10-CM

## 2025-05-15 DIAGNOSIS — D24.9 BENIGN NEOPLASM OF UNSPECIFIED BREAST: ICD-10-CM

## 2025-05-15 DIAGNOSIS — M85.80 OTHER SPECIFIED DISORDERS OF BONE DENSITY AND STRUCTURE, UNSPECIFIED SITE: ICD-10-CM

## 2025-05-15 DIAGNOSIS — S42.292A OTHER DISPLACED FRACTURE OF UPPER END OF LEFT HUMERUS, INITIAL ENCOUNTER FOR CLOSED FRACTURE: ICD-10-CM

## 2025-05-15 DIAGNOSIS — Z86.73 PERSONAL HISTORY OF TRANSIENT ISCHEMIC ATTACK (TIA), AND CEREBRAL INFARCTION W/OUT RESIDUAL DEFICITS: ICD-10-CM

## 2025-05-15 DIAGNOSIS — D80.3 SELECTIVE DEFICIENCY OF IMMUNOGLOBULIN G [IGG] SUBCLASSES: ICD-10-CM

## 2025-05-15 DIAGNOSIS — N39.0 URINARY TRACT INFECTION, SITE NOT SPECIFIED: ICD-10-CM

## 2025-05-15 DIAGNOSIS — M25.462 EFFUSION, LEFT KNEE: ICD-10-CM

## 2025-05-15 DIAGNOSIS — M79.7 FIBROMYALGIA: ICD-10-CM

## 2025-05-15 DIAGNOSIS — M17.0 BILATERAL PRIMARY OSTEOARTHRITIS OF KNEE: ICD-10-CM

## 2025-05-15 DIAGNOSIS — M25.561 PAIN IN RIGHT KNEE: ICD-10-CM

## 2025-05-15 DIAGNOSIS — Z01.818 ENCOUNTER FOR OTHER PREPROCEDURAL EXAMINATION: ICD-10-CM

## 2025-05-15 DIAGNOSIS — Z12.11 ENCOUNTER FOR SCREENING FOR MALIGNANT NEOPLASM OF COLON: ICD-10-CM

## 2025-05-15 DIAGNOSIS — Z86.018 PERSONAL HISTORY OF OTHER BENIGN NEOPLASM: ICD-10-CM

## 2025-05-15 DIAGNOSIS — D17.20 BENIGN LIPOMATOUS NEOPLASM OF SKIN AND SUBCUTANEOUS TISSUE OF UNSPECIFIED LIMB: ICD-10-CM

## 2025-05-15 DIAGNOSIS — M25.562 PAIN IN RIGHT KNEE: ICD-10-CM

## 2025-05-15 DIAGNOSIS — S83.242D OTHER TEAR OF MEDIAL MENISCUS, CURRENT INJURY, LEFT KNEE, SUBSEQUENT ENCOUNTER: ICD-10-CM

## 2025-05-15 DIAGNOSIS — Z12.31 ENCOUNTER FOR SCREENING MAMMOGRAM FOR MALIGNANT NEOPLASM OF BREAST: ICD-10-CM

## 2025-05-15 DIAGNOSIS — E04.2 NONTOXIC MULTINODULAR GOITER: ICD-10-CM

## 2025-05-15 DIAGNOSIS — N94.89 OTHER SPECIFIED CONDITIONS ASSOCIATED WITH FEMALE GENITAL ORGANS AND MENSTRUAL CYCLE: ICD-10-CM

## 2025-05-15 DIAGNOSIS — Z87.448 PERSONAL HISTORY OF OTHER DISEASES OF URINARY SYSTEM: ICD-10-CM

## 2025-05-15 DIAGNOSIS — Z01.419 ENCOUNTER FOR GYNECOLOGICAL EXAMINATION (GENERAL) (ROUTINE) W/OUT ABNORMAL FINDINGS: ICD-10-CM

## 2025-05-15 DIAGNOSIS — E78.5 HYPERLIPIDEMIA, UNSPECIFIED: ICD-10-CM

## 2025-05-15 DIAGNOSIS — R92.30 DENSE BREASTS, UNSPECIFIED: ICD-10-CM

## 2025-05-15 PROCEDURE — 36415 COLL VENOUS BLD VENIPUNCTURE: CPT

## 2025-05-15 PROCEDURE — 99204 OFFICE O/P NEW MOD 45 MIN: CPT | Mod: 25

## 2025-05-15 PROCEDURE — G2211 COMPLEX E/M VISIT ADD ON: CPT

## 2025-05-15 PROCEDURE — G0438: CPT

## 2025-05-15 RX ORDER — BUDESONIDE, GLYCOPYRROLATE, AND FORMOTEROL FUMARATE 160; 9; 4.8 UG/1; UG/1; UG/1
160-9-4.8 AEROSOL, METERED RESPIRATORY (INHALATION)
Refills: 0 | Status: ACTIVE | COMMUNITY

## 2025-05-15 RX ORDER — FAMOTIDINE 40 MG/1
40 TABLET, FILM COATED ORAL
Refills: 0 | Status: ACTIVE | COMMUNITY

## 2025-05-15 RX ORDER — DENOSUMAB 60 MG/ML
60 INJECTION SUBCUTANEOUS
Refills: 0 | Status: ACTIVE | COMMUNITY

## 2025-05-15 RX ORDER — TIRZEPATIDE 2.5 MG/.5ML
2.5 INJECTION, SOLUTION SUBCUTANEOUS
Qty: 1 | Refills: 0 | Status: DISCONTINUED | COMMUNITY
Start: 2025-05-15 | End: 2025-05-15

## 2025-05-15 RX ORDER — ALBUTEROL SULFATE 90 UG/1
108 (90 BASE) INHALANT RESPIRATORY (INHALATION)
Refills: 0 | Status: ACTIVE | COMMUNITY

## 2025-05-15 NOTE — HEALTH RISK ASSESSMENT
[Good] : ~his/her~  mood as  good [Monthly or less (1 pt)] : Monthly or less (1 point) [1 or 2 (0 pts)] : 1 or 2 (0 points) [Never (0 pts)] : Never (0 points) [No] : In the past 12 months have you used drugs other than those required for medical reasons? No [One fall no injury in past year] : Patient reported one fall in the past year without injury [0] : 2) Feeling down, depressed, or hopeless: Not at all (0) [Patient refused screening] : Patient refused screening [PHQ-2 Negative - No further assessment needed] : PHQ-2 Negative - No further assessment needed [Audit-CScore] : 1 [[id number]] : 0 [Yes] : Reviewed medication list for presence of high-risk medications. [Opioids] : opioids [FreeTextEntry1] : No opiate use. [Never] : Never [Patient reported mammogram was normal] : Patient reported mammogram was normal [Patient reported PAP Smear was abnormal] : Patient reported PAP Smear was abnormal [Patient reported bone density results were abnormal] : Patient reported bone density results were abnormal [Patient reported colonoscopy was normal] : Patient reported colonoscopy was normal [Change in mental status noted] : No change in mental status noted [Language] : denies difficulty with language [Behavior] : denies difficulty with behavior [Learning/Retaining New Information] : denies difficulty learning/retaining new information [Handling Complex Tasks] : denies difficulty handling complex tasks [Reasoning] : denies difficulty with reasoning [Spatial Ability and Orientation] : denies difficulty with spatial ability and orientation [None] : None [With Family] : lives with family [On disability] : on disability [Retired] : retired [] :  [# Of Children ___] : has [unfilled] children [Fully functional (bathing, dressing, toileting, transferring, walking, feeding)] : Fully functional (bathing, dressing, toileting, transferring, walking, feeding) [Fully functional (using the telephone, shopping, preparing meals, housekeeping, doing laundry, using] : Fully functional and needs no help or supervision to perform IADLs (using the telephone, shopping, preparing meals, housekeeping, doing laundry, using transportation, managing medications and managing finances) [Reports changes in hearing] : Reports no changes in hearing [Reports changes in vision] : Reports no changes in vision [Reports normal functional visual acuity (ie: able to read med bottle)] : Reports normal functional visual acuity [PapSmearComments] : s/p hysterectomy [Patient/Caregiver not ready to engage] : , patient/caregiver not ready to engage [AdvancecareDate] : 05/25

## 2025-05-15 NOTE — PHYSICAL EXAM
[No Acute Distress] : no acute distress [Well-Appearing] : well-appearing [Normal Sclera/Conjunctiva] : normal sclera/conjunctiva [PERRL] : pupils equal round and reactive to light [EOMI] : extraocular movements intact [Normal Outer Ear/Nose] : the outer ears and nose were normal in appearance [Normal Oropharynx] : the oropharynx was normal [Normal TMs] : both tympanic membranes were normal [No JVD] : no jugular venous distention [No Lymphadenopathy] : no lymphadenopathy [Supple] : supple [Thyroid Normal, No Nodules] : the thyroid was normal and there were no nodules present [No Respiratory Distress] : no respiratory distress  [No Accessory Muscle Use] : no accessory muscle use [Clear to Auscultation] : lungs were clear to auscultation bilaterally [Normal Rate] : normal rate  [Regular Rhythm] : with a regular rhythm [Normal S1, S2] : normal S1 and S2 [No Murmur] : no murmur heard [No Abdominal Bruit] : a ~M bruit was not heard ~T in the abdomen [No Varicosities] : no varicosities [No Edema] : there was no peripheral edema [No Palpable Aorta] : no palpable aorta [No Extremity Clubbing/Cyanosis] : no extremity clubbing/cyanosis [Soft] : abdomen soft [Non Tender] : non-tender [Non-distended] : non-distended [No Masses] : no abdominal mass palpated [No HSM] : no HSM [Normal Bowel Sounds] : normal bowel sounds [Normal Posterior Cervical Nodes] : no posterior cervical lymphadenopathy [Normal Anterior Cervical Nodes] : no anterior cervical lymphadenopathy [No CVA Tenderness] : no CVA  tenderness [No Spinal Tenderness] : no spinal tenderness [No Joint Swelling] : no joint swelling [Grossly Normal Strength/Tone] : grossly normal strength/tone [No Rash] : no rash [Coordination Grossly Intact] : coordination grossly intact [No Focal Deficits] : no focal deficits [Normal Gait] : normal gait [Normal Affect] : the affect was normal [Normal Insight/Judgement] : insight and judgment were intact [de-identified] : obesity

## 2025-05-15 NOTE — HISTORY OF PRESENT ILLNESS
[de-identified] : 64 year old F with PMH multiple medical issues presents to establish care, annual, follow up of chronic conditions, and requests help with weight loss. Pt denies CP/SOB, fever/chills, n/v/d/c.

## 2025-05-15 NOTE — PLAN
[FreeTextEntry1] : Routine blood work drawn in office and urine collected today. Start Wegovy (originally sent Zepbound but won't be covered). Check B12 level requested. Refill meds. Refer to neurology. Obtain multiple records. Pt advised to sign up for [facility] portal to review labs and communicate any questions or concerns directly. Yearly physical and return as needed for illness, medication refills, and new or existing complaints.

## 2025-05-16 ENCOUNTER — TRANSCRIPTION ENCOUNTER (OUTPATIENT)
Age: 65
End: 2025-05-16

## 2025-05-16 LAB
25(OH)D3 SERPL-MCNC: 55.4 NG/ML
ALBUMIN SERPL ELPH-MCNC: 4.9 G/DL
ALP BLD-CCNC: 63 U/L
ALT SERPL-CCNC: 26 U/L
ANION GAP SERPL CALC-SCNC: 16 MMOL/L
APPEARANCE: CLEAR
AST SERPL-CCNC: 17 U/L
BACTERIA: ABNORMAL /HPF
BASOPHILS # BLD AUTO: 0.01 K/UL
BASOPHILS NFR BLD AUTO: 0.1 %
BILIRUB SERPL-MCNC: 0.4 MG/DL
BILIRUBIN URINE: NEGATIVE
BLOOD URINE: NEGATIVE
BUN SERPL-MCNC: 26 MG/DL
CALCIUM SERPL-MCNC: 9.8 MG/DL
CAST: 0 /LPF
CHLORIDE SERPL-SCNC: 104 MMOL/L
CHOLEST SERPL-MCNC: 176 MG/DL
CO2 SERPL-SCNC: 23 MMOL/L
COLOR: YELLOW
CREAT SERPL-MCNC: 0.74 MG/DL
EGFRCR SERPLBLD CKD-EPI 2021: 89 ML/MIN/1.73M2
EOSINOPHIL # BLD AUTO: 0.05 K/UL
EOSINOPHIL NFR BLD AUTO: 0.7 %
EPITHELIAL CELLS: 5 /HPF
ESTIMATED AVERAGE GLUCOSE: 103 MG/DL
GLUCOSE QUALITATIVE U: NEGATIVE MG/DL
GLUCOSE SERPL-MCNC: 93 MG/DL
HBA1C MFR BLD HPLC: 5.2 %
HCT VFR BLD CALC: 44.8 %
HDLC SERPL-MCNC: 91 MG/DL
HGB BLD-MCNC: 13.9 G/DL
IMM GRANULOCYTES NFR BLD AUTO: 0.5 %
KETONES URINE: NEGATIVE MG/DL
LDLC SERPL-MCNC: 71 MG/DL
LEUKOCYTE ESTERASE URINE: ABNORMAL
LYMPHOCYTES # BLD AUTO: 1.05 K/UL
LYMPHOCYTES NFR BLD AUTO: 14.2 %
MAN DIFF?: NORMAL
MCHC RBC-ENTMCNC: 29.4 PG
MCHC RBC-ENTMCNC: 31 G/DL
MCV RBC AUTO: 94.7 FL
MICROSCOPIC-UA: NORMAL
MONOCYTES # BLD AUTO: 0.64 K/UL
MONOCYTES NFR BLD AUTO: 8.7 %
NEUTROPHILS # BLD AUTO: 5.59 K/UL
NEUTROPHILS NFR BLD AUTO: 75.8 %
NITRITE URINE: NEGATIVE
NONHDLC SERPL-MCNC: 85 MG/DL
PH URINE: 6.5
PLATELET # BLD AUTO: 273 K/UL
POTASSIUM SERPL-SCNC: 4 MMOL/L
PROT SERPL-MCNC: 7.3 G/DL
PROTEIN URINE: NORMAL MG/DL
RBC # BLD: 4.73 M/UL
RBC # FLD: 15.6 %
RED BLOOD CELLS URINE: 6 /HPF
REVIEW: NORMAL
SODIUM SERPL-SCNC: 143 MMOL/L
SPECIFIC GRAVITY URINE: 1.03
T4 FREE SERPL-MCNC: 1.1 NG/DL
TRIGL SERPL-MCNC: 74 MG/DL
TSH SERPL-ACNC: 1.34 UIU/ML
UROBILINOGEN URINE: 0.2 MG/DL
VIT B12 SERPL-MCNC: 819 PG/ML
WAXY CASTS: PRESENT
WBC # FLD AUTO: 7.38 K/UL
WHITE BLOOD CELLS URINE: 2 /HPF

## 2025-05-19 ENCOUNTER — TRANSCRIPTION ENCOUNTER (OUTPATIENT)
Age: 65
End: 2025-05-19

## 2025-05-20 ENCOUNTER — TRANSCRIPTION ENCOUNTER (OUTPATIENT)
Age: 65
End: 2025-05-20

## 2025-05-20 RX ORDER — SEMAGLUTIDE 0.25 MG/.5ML
0.25 INJECTION, SOLUTION SUBCUTANEOUS
Qty: 1 | Refills: 0 | Status: ACTIVE | COMMUNITY
Start: 2025-05-15

## 2025-05-21 ENCOUNTER — TRANSCRIPTION ENCOUNTER (OUTPATIENT)
Age: 65
End: 2025-05-21

## 2025-05-22 ENCOUNTER — TRANSCRIPTION ENCOUNTER (OUTPATIENT)
Age: 65
End: 2025-05-22

## 2025-06-16 ENCOUNTER — RX RENEWAL (OUTPATIENT)
Age: 65
End: 2025-06-16

## 2025-06-16 RX ORDER — SEMAGLUTIDE 0.5 MG/.5ML
0.5 INJECTION, SOLUTION SUBCUTANEOUS
Qty: 1 | Refills: 0 | Status: ACTIVE | COMMUNITY
Start: 2025-06-16 | End: 1900-01-01

## 2025-06-17 ENCOUNTER — TRANSCRIPTION ENCOUNTER (OUTPATIENT)
Age: 65
End: 2025-06-17

## 2025-07-09 ENCOUNTER — TRANSCRIPTION ENCOUNTER (OUTPATIENT)
Age: 65
End: 2025-07-09

## 2025-07-14 ENCOUNTER — TRANSCRIPTION ENCOUNTER (OUTPATIENT)
Age: 65
End: 2025-07-14

## 2025-07-14 ENCOUNTER — RX RENEWAL (OUTPATIENT)
Age: 65
End: 2025-07-14

## 2025-07-14 RX ORDER — SEMAGLUTIDE 1 MG/.5ML
1 INJECTION, SOLUTION SUBCUTANEOUS
Qty: 1 | Refills: 0 | Status: ACTIVE | COMMUNITY
Start: 2025-07-14 | End: 1900-01-01

## 2025-07-15 ENCOUNTER — TRANSCRIPTION ENCOUNTER (OUTPATIENT)
Age: 65
End: 2025-07-15

## 2025-07-16 ENCOUNTER — APPOINTMENT (OUTPATIENT)
Dept: INTERNAL MEDICINE | Facility: CLINIC | Age: 65
End: 2025-07-16
Payer: MEDICARE

## 2025-07-16 VITALS
WEIGHT: 175 LBS | BODY MASS INDEX: 33.47 KG/M2 | HEIGHT: 60.5 IN | SYSTOLIC BLOOD PRESSURE: 129 MMHG | TEMPERATURE: 98.1 F | DIASTOLIC BLOOD PRESSURE: 71 MMHG | OXYGEN SATURATION: 98 % | HEART RATE: 89 BPM

## 2025-07-16 PROBLEM — E66.811 CLASS 1 OBESITY DUE TO EXCESS CALORIES WITH SERIOUS COMORBIDITY AND BODY MASS INDEX (BMI) OF 34.0 TO 34.9 IN ADULT: Noted: 2025-05-15

## 2025-07-16 PROBLEM — E66.811 CLASS 1 OBESITY DUE TO EXCESS CALORIES WITH SERIOUS COMORBIDITY AND BODY MASS INDEX (BMI) OF 33.0 TO 33.9 IN ADULT: Status: ACTIVE | Noted: 2025-07-16

## 2025-07-16 PROBLEM — R10.9 ABDOMINAL CRAMPING: Status: ACTIVE | Noted: 2025-07-16

## 2025-07-16 PROCEDURE — 99214 OFFICE O/P EST MOD 30 MIN: CPT

## 2025-07-16 PROCEDURE — G2211 COMPLEX E/M VISIT ADD ON: CPT

## 2025-07-16 PROCEDURE — 36415 COLL VENOUS BLD VENIPUNCTURE: CPT

## 2025-07-16 NOTE — HISTORY OF PRESENT ILLNESS
[de-identified] : 65 year old F with PMH multiple medical issues presents for follow up of weight loss and some side effects of medication. Pt denies CP/SOB, fever/chills, n/v/d/c.

## 2025-07-16 NOTE — PLAN
[FreeTextEntry1] : Check blood work in office today. Pt advised to sign up for [facility] portal to review labs and communicate any questions or concerns directly. Yearly physical and return as needed for illness, medication refills, and new or existing complaints.

## 2025-07-17 ENCOUNTER — TRANSCRIPTION ENCOUNTER (OUTPATIENT)
Age: 65
End: 2025-07-17

## 2025-07-17 ENCOUNTER — APPOINTMENT (OUTPATIENT)
Dept: INTERNAL MEDICINE | Facility: CLINIC | Age: 65
End: 2025-07-17

## 2025-07-17 LAB
ALBUMIN SERPL ELPH-MCNC: 4.6 G/DL
ALP BLD-CCNC: 56 U/L
ALT SERPL-CCNC: 23 U/L
AMYLASE/CREAT SERPL: 38 U/L
ANION GAP SERPL CALC-SCNC: 15 MMOL/L
AST SERPL-CCNC: 19 U/L
BASOPHILS # BLD AUTO: 0.03 K/UL
BASOPHILS NFR BLD AUTO: 0.7 %
BILIRUB SERPL-MCNC: 0.4 MG/DL
BUN SERPL-MCNC: 22 MG/DL
CALCIUM SERPL-MCNC: 9.5 MG/DL
CHLORIDE SERPL-SCNC: 103 MMOL/L
CO2 SERPL-SCNC: 24 MMOL/L
CREAT SERPL-MCNC: 0.72 MG/DL
EGFRCR SERPLBLD CKD-EPI 2021: 92 ML/MIN/1.73M2
EOSINOPHIL # BLD AUTO: 0.08 K/UL
EOSINOPHIL NFR BLD AUTO: 1.9 %
GLUCOSE SERPL-MCNC: 81 MG/DL
HCT VFR BLD CALC: 40.7 %
HGB BLD-MCNC: 13.1 G/DL
IMM GRANULOCYTES NFR BLD AUTO: 0.2 %
LPL SERPL-CCNC: 29 U/L
LYMPHOCYTES # BLD AUTO: 0.8 K/UL
LYMPHOCYTES NFR BLD AUTO: 18.8 %
MAN DIFF?: NORMAL
MCHC RBC-ENTMCNC: 30.2 PG
MCHC RBC-ENTMCNC: 32.2 G/DL
MCV RBC AUTO: 93.8 FL
MONOCYTES # BLD AUTO: 0.44 K/UL
MONOCYTES NFR BLD AUTO: 10.3 %
NEUTROPHILS # BLD AUTO: 2.9 K/UL
NEUTROPHILS NFR BLD AUTO: 68.1 %
PLATELET # BLD AUTO: 240 K/UL
POTASSIUM SERPL-SCNC: 4.4 MMOL/L
PROT SERPL-MCNC: 6.7 G/DL
RBC # BLD: 4.34 M/UL
RBC # FLD: 15.5 %
SODIUM SERPL-SCNC: 142 MMOL/L
WBC # FLD AUTO: 4.26 K/UL

## 2025-08-08 ENCOUNTER — APPOINTMENT (OUTPATIENT)
Dept: ORTHOPEDIC SURGERY | Facility: CLINIC | Age: 65
End: 2025-08-08
Payer: MEDICARE

## 2025-08-08 DIAGNOSIS — M87.9 OSTEONECROSIS, UNSPECIFIED: ICD-10-CM

## 2025-08-08 DIAGNOSIS — M75.02 ADHESIVE CAPSULITIS OF LEFT SHOULDER: ICD-10-CM

## 2025-08-08 DIAGNOSIS — S42.295D OTHER NONDISPLACED FRACTURE OF UPPER END OF LEFT HUMERUS, SUBSEQUENT ENCOUNTER FOR FRACTURE WITH ROUTINE HEALING: ICD-10-CM

## 2025-08-08 PROCEDURE — 73030 X-RAY EXAM OF SHOULDER: CPT | Mod: LT

## 2025-08-08 PROCEDURE — 99214 OFFICE O/P EST MOD 30 MIN: CPT

## 2025-08-08 PROCEDURE — 73010 X-RAY EXAM OF SHOULDER BLADE: CPT | Mod: LT

## 2025-08-08 RX ORDER — MELOXICAM 15 MG/1
15 TABLET ORAL
Qty: 30 | Refills: 1 | Status: ACTIVE | COMMUNITY
Start: 2025-08-08 | End: 1900-01-01

## 2025-08-08 RX ORDER — METHYLPREDNISOLONE 4 MG/1
4 TABLET ORAL
Qty: 1 | Refills: 0 | Status: ACTIVE | COMMUNITY
Start: 2025-08-08 | End: 1900-01-01

## 2025-08-11 ENCOUNTER — TRANSCRIPTION ENCOUNTER (OUTPATIENT)
Age: 65
End: 2025-08-11

## 2025-08-11 ENCOUNTER — RX RENEWAL (OUTPATIENT)
Age: 65
End: 2025-08-11

## 2025-08-12 ENCOUNTER — TRANSCRIPTION ENCOUNTER (OUTPATIENT)
Age: 65
End: 2025-08-12

## 2025-08-12 RX ORDER — SEMAGLUTIDE 1.7 MG/.75ML
1.7 INJECTION, SOLUTION SUBCUTANEOUS
Qty: 1 | Refills: 0 | Status: ACTIVE | COMMUNITY
Start: 2025-08-12 | End: 1900-01-01

## 2025-09-04 ENCOUNTER — APPOINTMENT (OUTPATIENT)
Dept: INTERNAL MEDICINE | Facility: CLINIC | Age: 65
End: 2025-09-04
Payer: MEDICARE

## 2025-09-04 VITALS
DIASTOLIC BLOOD PRESSURE: 63 MMHG | WEIGHT: 165 LBS | TEMPERATURE: 98 F | OXYGEN SATURATION: 99 % | SYSTOLIC BLOOD PRESSURE: 103 MMHG | HEART RATE: 75 BPM | HEIGHT: 60.5 IN | RESPIRATION RATE: 16 BRPM | BODY MASS INDEX: 31.56 KG/M2

## 2025-09-04 DIAGNOSIS — I10 ESSENTIAL (PRIMARY) HYPERTENSION: ICD-10-CM

## 2025-09-04 DIAGNOSIS — E66.811 OBESITY, CLASS 1: ICD-10-CM

## 2025-09-04 DIAGNOSIS — E66.09 OBESITY, CLASS 1: ICD-10-CM

## 2025-09-04 DIAGNOSIS — Z86.73 PERSONAL HISTORY OF TRANSIENT ISCHEMIC ATTACK (TIA), AND CEREBRAL INFARCTION W/OUT RESIDUAL DEFICITS: ICD-10-CM

## 2025-09-04 PROCEDURE — G2211 COMPLEX E/M VISIT ADD ON: CPT

## 2025-09-04 PROCEDURE — 99214 OFFICE O/P EST MOD 30 MIN: CPT

## 2025-09-06 RX ORDER — SEMAGLUTIDE 1.7 MG/.75ML
1.7 INJECTION, SOLUTION SUBCUTANEOUS
Qty: 3 | Refills: 0 | Status: ACTIVE | COMMUNITY
Start: 2025-08-12 | End: 1900-01-01

## 2025-09-08 ENCOUNTER — RX RENEWAL (OUTPATIENT)
Age: 65
End: 2025-09-08

## 2025-09-15 ENCOUNTER — TRANSCRIPTION ENCOUNTER (OUTPATIENT)
Age: 65
End: 2025-09-15

## (undated) DEVICE — ELCTR BOVIE TIP BLADE INSULATED 2.75" EDGE

## (undated) DEVICE — ELCTR GROUNDING PAD ADULT COVIDIEN

## (undated) DEVICE — GLV 5.5 PROTEXIS

## (undated) DEVICE — D HELP - CLEARVIEW CLEARIFY SYSTEM

## (undated) DEVICE — TROCAR SURGIQUEST AIRSEAL 8MMX100MM

## (undated) DEVICE — DRSG STERISTRIPS 0.5 X 4"

## (undated) DEVICE — XI ARM PERMANENT CAUTERY SPATULA

## (undated) DEVICE — POSITIONER PURPLE ARM ONE STEP (LARGE)

## (undated) DEVICE — DRSG OPSITE 13.75 X 4"

## (undated) DEVICE — VENODYNE/SCD SLEEVE CALF MEDIUM

## (undated) DEVICE — XI SEAL UNIV 5- 8 MM

## (undated) DEVICE — XI TIP COVER

## (undated) DEVICE — POSITIONER FOAM HEAD CRADLE (PINK)

## (undated) DEVICE — XI DRAPE COLUMN

## (undated) DEVICE — ENDOCATCH 10MM SPECIMEN POUCH

## (undated) DEVICE — XI ARM FORCEP FENESTRATED BIPOLAR 8MM

## (undated) DEVICE — PROTECTOR HEEL / ELBOW

## (undated) DEVICE — XI NEEDLE DRIVER LARGE

## (undated) DEVICE — POSITIONER PINK PAD PIGAZZI SYSTEM

## (undated) DEVICE — UTERINE MANIPULATOR CONMED VCARE SM 32MM

## (undated) DEVICE — PACK ROBOTIC LIJ

## (undated) DEVICE — GLV 6 PROTEXIS (WHITE)

## (undated) DEVICE — TUBING STRYKEFLOW II SUCTION / IRRIGATOR

## (undated) DEVICE — LUBRICATING JELLY ONESHOT 1.25OZ

## (undated) DEVICE — UTERINE MANIPULATOR MPM MEDICAL ZUMI 4.5MM

## (undated) DEVICE — TUBING AIRSEAL TRI-LUMEN FILTERED

## (undated) DEVICE — XI DRAPE ARM

## (undated) DEVICE — XI ARM SCISSOR MONO CURVED

## (undated) DEVICE — GOWN XL

## (undated) DEVICE — SUT VICRYL 0 27" UR-6

## (undated) DEVICE — SYR LUER LOK 10CC

## (undated) DEVICE — SUT MONOCRYL 4-0 27" PS-2 UNDYED

## (undated) DEVICE — GOWN XXXL

## (undated) DEVICE — XI ARM FORCEP TENACULUM

## (undated) DEVICE — FOLEY TRAY 16FR 5CC LF UMETER CLOSED

## (undated) DEVICE — PACK PERI GYN

## (undated) DEVICE — XI ARM FORCEP PROGRASP 8MM

## (undated) DEVICE — XI ARM GRASPER TIP UP FENESTRATED

## (undated) DEVICE — SUT VICRYL 0 27" CT-2 UNDYED

## (undated) DEVICE — XI ARM PERMANENT CAUTERY HOOK

## (undated) DEVICE — UTERINE MANIPULATOR CONMED VCARE MED 34MM

## (undated) DEVICE — SUT VLOC 180 3-0 9" V-20 GREEN

## (undated) DEVICE — UTERINE MANIPULATOR CONMED VCARE LG 37MM

## (undated) DEVICE — XI OBTURATOR OPTICAL BLADELESS 8MM

## (undated) DEVICE — XI ARM FORCEP MARYLAND BIPOLAR

## (undated) DEVICE — POSITIONER STRAP ARMBOARD VELCRO TS-30